# Patient Record
Sex: MALE | Race: WHITE | NOT HISPANIC OR LATINO | Employment: OTHER | URBAN - NONMETROPOLITAN AREA
[De-identification: names, ages, dates, MRNs, and addresses within clinical notes are randomized per-mention and may not be internally consistent; named-entity substitution may affect disease eponyms.]

---

## 2017-03-23 ENCOUNTER — OFFICE VISIT (OUTPATIENT)
Dept: CARDIOLOGY | Facility: CLINIC | Age: 52
End: 2017-03-23

## 2017-03-23 VITALS
WEIGHT: 234 LBS | OXYGEN SATURATION: 98 % | SYSTOLIC BLOOD PRESSURE: 170 MMHG | HEART RATE: 86 BPM | BODY MASS INDEX: 32.76 KG/M2 | HEIGHT: 71 IN | DIASTOLIC BLOOD PRESSURE: 82 MMHG

## 2017-03-23 DIAGNOSIS — E66.09 NON MORBID OBESITY DUE TO EXCESS CALORIES: ICD-10-CM

## 2017-03-23 DIAGNOSIS — Z72.0 TOBACCO ABUSE: ICD-10-CM

## 2017-03-23 DIAGNOSIS — Z01.818 PREOPERATIVE EVALUATION TO RULE OUT SURGICAL CONTRAINDICATION: ICD-10-CM

## 2017-03-23 DIAGNOSIS — I48.0 PAROXYSMAL ATRIAL FIBRILLATION (HCC): ICD-10-CM

## 2017-03-23 DIAGNOSIS — I50.22 CHRONIC SYSTOLIC (CONGESTIVE) HEART FAILURE (HCC): Primary | ICD-10-CM

## 2017-03-23 DIAGNOSIS — I10 ESSENTIAL HYPERTENSION: ICD-10-CM

## 2017-03-23 PROBLEM — E78.5 DYSLIPIDEMIA: Status: ACTIVE | Noted: 2017-03-23

## 2017-03-23 PROBLEM — K21.9 GERD (GASTROESOPHAGEAL REFLUX DISEASE): Status: ACTIVE | Noted: 2017-03-23

## 2017-03-23 PROBLEM — F41.9 ANXIETY: Status: ACTIVE | Noted: 2017-03-23

## 2017-03-23 PROBLEM — J44.9 COPD (CHRONIC OBSTRUCTIVE PULMONARY DISEASE) (HCC): Status: ACTIVE | Noted: 2017-03-23

## 2017-03-23 PROCEDURE — 99214 OFFICE O/P EST MOD 30 MIN: CPT | Performed by: INTERNAL MEDICINE

## 2017-03-23 RX ORDER — CARVEDILOL 3.12 MG/1
3.12 TABLET ORAL 2 TIMES DAILY
Refills: 1 | COMMUNITY
Start: 2017-03-08 | End: 2017-03-23 | Stop reason: SDUPTHER

## 2017-03-23 RX ORDER — HYDROCODONE BITARTRATE AND ACETAMINOPHEN 7.5; 325 MG/1; MG/1
7.5-325 TABLET ORAL 4 TIMES DAILY
Refills: 0 | Status: ON HOLD | COMMUNITY
Start: 2017-03-01 | End: 2017-11-22

## 2017-03-23 RX ORDER — DULOXETINE HYDROCHLORIDE 30 MG/1
250 CAPSULE, DELAYED RELEASE ORAL DAILY
Refills: 0 | COMMUNITY
Start: 2017-02-23

## 2017-03-23 RX ORDER — APIXABAN 5 MG/1
5 TABLET, FILM COATED ORAL 2 TIMES DAILY
Qty: 60 TABLET | Refills: 11 | Status: SHIPPED | OUTPATIENT
Start: 2017-03-23 | End: 2020-05-04 | Stop reason: SDUPTHER

## 2017-03-23 RX ORDER — CARVEDILOL 6.25 MG/1
6.25 TABLET ORAL 2 TIMES DAILY
Qty: 60 TABLET | Refills: 11 | Status: SHIPPED | OUTPATIENT
Start: 2017-03-23 | End: 2018-01-18 | Stop reason: SDUPTHER

## 2017-03-23 RX ORDER — OXYCODONE AND ACETAMINOPHEN 10; 325 MG/1; MG/1
10-325 TABLET ORAL 4 TIMES DAILY
Refills: 0 | COMMUNITY
Start: 2017-03-20 | End: 2019-01-10

## 2017-03-23 RX ORDER — LISINOPRIL 20 MG/1
20 TABLET ORAL DAILY
Refills: 0 | Status: ON HOLD | COMMUNITY
Start: 2017-02-22 | End: 2017-11-22

## 2017-03-23 RX ORDER — FUROSEMIDE 40 MG/1
40 TABLET ORAL DAILY
Refills: 0 | COMMUNITY
Start: 2017-01-21 | End: 2017-03-23 | Stop reason: SDUPTHER

## 2017-03-23 RX ORDER — APIXABAN 5 MG/1
5 TABLET, FILM COATED ORAL 2 TIMES DAILY
Refills: 0 | COMMUNITY
Start: 2017-01-04 | End: 2017-03-23 | Stop reason: SDUPTHER

## 2017-03-23 RX ORDER — FUROSEMIDE 40 MG/1
40 TABLET ORAL DAILY
Qty: 30 TABLET | Refills: 11 | Status: SHIPPED | OUTPATIENT
Start: 2017-03-23

## 2017-03-23 RX ORDER — PANTOPRAZOLE SODIUM 40 MG/1
40 TABLET, DELAYED RELEASE ORAL DAILY
Refills: 0 | COMMUNITY
Start: 2017-03-07

## 2017-03-23 RX ORDER — HYDROXYZINE PAMOATE 50 MG/1
50 CAPSULE ORAL DAILY
Refills: 0 | COMMUNITY
Start: 2016-12-15 | End: 2019-01-10

## 2017-03-23 RX ORDER — CITALOPRAM 20 MG/1
20 TABLET ORAL DAILY
Refills: 0 | COMMUNITY
Start: 2017-02-22

## 2017-03-23 NOTE — PROGRESS NOTES
Reason for Visit: cardiovascular follow up.    HPI:  Juan A Chavez is a 52 y.o. male is here today for follow-up.  He was seen in the emergency room yesterday for dyspnea.  NT-proBNP was elevated at 1867 (< 900 is normal).  He was diagnosed with a mild CHF exacerbation with possible pneumonia.  His BNP on 2/14/16 was 2020.  CXR demonstrates mildly prominent interstitial markings, cannot exclude mild right lower lobe infiltrate.  He woke up yesterday and was unable to breath.  Felt like he could not draw a breath.  He was given a breathing treatment, lasix, and antibiotics.  He feels better today overall but still is somewhat tired and wore out.  He has not noticed any swelling or weight change.  He has a hernia in his abdomen that needs to be repaired.  He is not very active and gets out of breath with exertion.  Dr Mallory is going to be doing the surgery.  He denies any chest pain.    Previous Cardiac Testing and Procedures:  - Echo (7/19/14) mild to moderately enlarged LV, EF 30-35%  - Lipid panel (2/15/16) 95/28/52/123    Patient Active Problem List   Diagnosis   • Chronic systolic (congestive) heart failure   • Paroxysmal atrial fibrillation   • GERD (gastroesophageal reflux disease)   • COPD (chronic obstructive pulmonary disease)   • Tobacco abuse   • Essential hypertension   • Dyslipidemia   • Anxiety       Social History   Substance Use Topics   • Smoking status: Current Every Day Smoker     Packs/day: 1.50     Types: Cigarettes   • Smokeless tobacco: Never Used   • Alcohol use Yes       Family History   Problem Relation Age of Onset   • Hypertension Other    • Heart disease Other    • Hyperlipidemia Other    • Heart disease Mother    • Stroke Mother    • No Known Problems Father        The following portions of the patient's history were reviewed and updated as appropriate: allergies, current medications, past family history, past medical history, past social history, past surgical history and problem  list.      Current Outpatient Prescriptions:   •  carvedilol (COREG) 3.125 MG tablet, Take 3.125 mg by mouth 2 (Two) Times a Day., Disp: , Rfl: 1  •  citalopram (CeleXA) 20 MG tablet, Take 20 mg by mouth 2 (Two) Times a Day., Disp: , Rfl: 0  •  DIGITEK 250 MCG tablet, Take 250 mcg by mouth Daily., Disp: , Rfl: 0  •  ELIQUIS 5 MG tablet tablet, Take 5 mg by mouth 2 (Two) Times a Day., Disp: , Rfl: 0  •  furosemide (LASIX) 40 MG tablet, Take 40 mg by mouth Daily., Disp: , Rfl: 0  •  HYDROcodone-acetaminophen (NORCO) 7.5-325 MG per tablet, Take 7.5-325 tablets by mouth 4 (Four) Times a Day., Disp: , Rfl: 0  •  hydrOXYzine (VISTARIL) 50 MG capsule, Take 50 mg by mouth Daily., Disp: , Rfl: 0  •  lisinopril (PRINIVIL,ZESTRIL) 20 MG tablet, Take 20 mg by mouth Daily., Disp: , Rfl: 0  •  oxyCODONE-acetaminophen (PERCOCET)  MG per tablet, Take  tablets by mouth 4 (Four) Times a Day., Disp: , Rfl: 0  •  pantoprazole (PROTONIX) 40 MG EC tablet, Take 40 mg by mouth Daily., Disp: , Rfl: 0    Review of Systems   Constitution: Positive for malaise/fatigue. Negative for chills, decreased appetite and fever.   HENT: Positive for congestion. Negative for headaches and nosebleeds.    Eyes: Negative for blurred vision and double vision.   Cardiovascular: Negative for chest pain, irregular heartbeat, leg swelling, orthopnea, palpitations and syncope.   Respiratory: Positive for cough and shortness of breath.    Endocrine: Negative for cold intolerance and heat intolerance.   Hematologic/Lymphatic: Does not bruise/bleed easily.   Skin: Positive for dry skin. Negative for itching and rash.   Musculoskeletal: Positive for back pain, joint pain and neck pain. Negative for muscle cramps.   Gastrointestinal: Positive for abdominal pain. Negative for constipation, diarrhea, heartburn and melena.   Genitourinary: Negative for dysuria, frequency and hematuria.   Neurological: Negative for dizziness, light-headedness and loss of  "balance.   Psychiatric/Behavioral: Positive for depression. The patient has insomnia and is nervous/anxious.        Objective   /82 (BP Location: Left arm, Patient Position: Sitting, Cuff Size: Large Adult)  Pulse 86  Ht 71\" (180.3 cm)  Wt 234 lb (106 kg)  SpO2 98%  BMI 32.64 kg/m2  Physical Exam   Constitutional: He is oriented to person, place, and time. He appears well-developed and well-nourished.   HENT:   Head: Normocephalic and atraumatic.   Cardiovascular: Normal rate, regular rhythm and normal heart sounds.    No murmur heard.  Pulmonary/Chest: Effort normal and breath sounds normal.   Musculoskeletal: He exhibits no edema.   Neurological: He is alert and oriented to person, place, and time.   Skin: Skin is warm and dry.   Psychiatric: He has a normal mood and affect.     Procedures      ICD-10-CM ICD-9-CM   1. Chronic systolic (congestive) heart failure I50.22 428.22     428.0   2. Paroxysmal atrial fibrillation I48.0 427.31   3. Essential hypertension I10 401.9   4. Tobacco abuse Z72.0 305.1   5. Non morbid obesity due to excess calories E66.09 278.00         Assessment/Plan:  1.  Chronic systolic congestive heart failure: BNP from yesterday is down from previous value 1 year ago.  Chest x-ray from 03/22/2017 demonstrates mild prominent interstitial markings, possible mild failure.  Patient is out of his Lasix area and we will refill this.  Will titrate up carvedilol to 6.25 mg twice a day.  Continue lisinopril.  Will repeat echocardiogram.    2.  Paroxysmal atrial fibrillation: Managed on carvedilol and anticoagulation with Eliquis.  Patient notes Medicare requires prior authorization for his Eliquis.  Will reorder prescription and performed prior authorization.    3.  Essential hypertension: Systolic blood pressure is elevated today.  Will increase carvedilol to 6.25 mg twice a day.  Continue lisinopril 20 mg daily, patient notes hypotension the higher doses.    4.  Tobacco abuse: Smoking " 1-1/2 packs per day.   on cessation.    5.  Preoperative evaluation: Patient appears intermediate risk from a cardiac standpoint.  Current chest x-ray demonstrates possible mild congestion.  Will optimize medical therapy with Lasix, carvedilol, and lisinopril.  Will repeat echocardiogram.  If there are no significant changes then could likely proceed to surgery as an intermediate risk candidate.

## 2017-03-29 ENCOUNTER — TELEPHONE (OUTPATIENT)
Dept: CARDIOLOGY | Facility: CLINIC | Age: 52
End: 2017-03-29

## 2017-03-29 NOTE — TELEPHONE ENCOUNTER
----- Message from Pradeep Hwnag MD sent at 3/29/2017 11:20 AM CDT -----  Please let him know that his echocardiogram showed that his heart function is relatively unchanged compared to the previous value.  It is reasonable for him to proceed to surgery for his hernia repair.      Called [facility] for pt to call me back.

## 2017-11-19 ENCOUNTER — APPOINTMENT (OUTPATIENT)
Dept: CT IMAGING | Facility: HOSPITAL | Age: 52
End: 2017-11-19

## 2017-11-19 ENCOUNTER — HOSPITAL ENCOUNTER (INPATIENT)
Facility: HOSPITAL | Age: 52
LOS: 3 days | Discharge: HOME | End: 2017-11-22
Attending: EMERGENCY MEDICINE | Admitting: INTERNAL MEDICINE

## 2017-11-19 ENCOUNTER — APPOINTMENT (OUTPATIENT)
Dept: GENERAL RADIOLOGY | Facility: HOSPITAL | Age: 52
End: 2017-11-19

## 2017-11-19 DIAGNOSIS — H10.023 OTHER MUCOPURULENT CONJUNCTIVITIS OF BOTH EYES: ICD-10-CM

## 2017-11-19 DIAGNOSIS — I21.4 NSTEMI (NON-ST ELEVATED MYOCARDIAL INFARCTION) (HCC): Primary | ICD-10-CM

## 2017-11-19 LAB
ALBUMIN SERPL-MCNC: 3.8 G/DL (ref 3.5–5)
ALBUMIN/GLOB SERPL: 1.2 G/DL (ref 1.1–2.5)
ALP SERPL-CCNC: 69 U/L (ref 24–120)
ALT SERPL W P-5'-P-CCNC: 109 U/L (ref 0–54)
ANION GAP SERPL CALCULATED.3IONS-SCNC: 9 MMOL/L (ref 4–13)
APTT PPP: 34 SECONDS (ref 24.1–34.8)
AST SERPL-CCNC: 105 U/L (ref 7–45)
BASOPHILS # BLD AUTO: 0.04 10*3/MM3 (ref 0–0.2)
BASOPHILS NFR BLD AUTO: 0.4 % (ref 0–2)
BILIRUB SERPL-MCNC: 0.5 MG/DL (ref 0.1–1)
BUN BLD-MCNC: 10 MG/DL (ref 5–21)
BUN/CREAT SERPL: 18.5 (ref 7–25)
CALCIUM SPEC-SCNC: 9.1 MG/DL (ref 8.4–10.4)
CHLORIDE SERPL-SCNC: 105 MMOL/L (ref 98–110)
CO2 SERPL-SCNC: 28 MMOL/L (ref 24–31)
CREAT BLD-MCNC: 0.54 MG/DL (ref 0.5–1.4)
DEPRECATED RDW RBC AUTO: 49.3 FL (ref 40–54)
DIGOXIN SERPL-MCNC: 0.9 NG/ML (ref 0.8–2)
EOSINOPHIL # BLD AUTO: 0.45 10*3/MM3 (ref 0–0.7)
EOSINOPHIL NFR BLD AUTO: 4.8 % (ref 0–4)
ERYTHROCYTE [DISTWIDTH] IN BLOOD BY AUTOMATED COUNT: 13.8 % (ref 12–15)
GFR SERPL CREATININE-BSD FRML MDRD: >150 ML/MIN/1.73
GLOBULIN UR ELPH-MCNC: 3.3 GM/DL
GLUCOSE BLD-MCNC: 81 MG/DL (ref 70–100)
HCT VFR BLD AUTO: 41.3 % (ref 40–52)
HGB BLD-MCNC: 13.9 G/DL (ref 14–18)
HOLD SPECIMEN: NORMAL
HOLD SPECIMEN: NORMAL
IMM GRANULOCYTES # BLD: 0.02 10*3/MM3 (ref 0–0.03)
IMM GRANULOCYTES NFR BLD: 0.2 % (ref 0–5)
INR PPP: 1.07 (ref 0.91–1.09)
LYMPHOCYTES # BLD AUTO: 2.36 10*3/MM3 (ref 0.72–4.86)
LYMPHOCYTES NFR BLD AUTO: 25.2 % (ref 15–45)
MCH RBC QN AUTO: 32.6 PG (ref 28–32)
MCHC RBC AUTO-ENTMCNC: 33.7 G/DL (ref 33–36)
MCV RBC AUTO: 96.7 FL (ref 82–95)
MONOCYTES # BLD AUTO: 1.09 10*3/MM3 (ref 0.19–1.3)
MONOCYTES NFR BLD AUTO: 11.7 % (ref 4–12)
NEUTROPHILS # BLD AUTO: 5.39 10*3/MM3 (ref 1.87–8.4)
NEUTROPHILS NFR BLD AUTO: 57.7 % (ref 39–78)
NT-PROBNP SERPL-MCNC: 2640 PG/ML (ref 0–900)
PLATELET # BLD AUTO: 165 10*3/MM3 (ref 130–400)
PMV BLD AUTO: 11.5 FL (ref 6–12)
POTASSIUM BLD-SCNC: 4 MMOL/L (ref 3.5–5.3)
PROT SERPL-MCNC: 7.1 G/DL (ref 6.3–8.7)
PROTHROMBIN TIME: 14.2 SECONDS (ref 11.9–14.6)
RBC # BLD AUTO: 4.27 10*6/MM3 (ref 4.8–5.9)
SODIUM BLD-SCNC: 142 MMOL/L (ref 135–145)
TROPONIN I SERPL-MCNC: 0.07 NG/ML (ref 0–0.03)
WBC NRBC COR # BLD: 9.35 10*3/MM3 (ref 4.8–10.8)
WHOLE BLOOD HOLD SPECIMEN: NORMAL
WHOLE BLOOD HOLD SPECIMEN: NORMAL

## 2017-11-19 PROCEDURE — 25010000002 HYDROMORPHONE PER 4 MG: Performed by: EMERGENCY MEDICINE

## 2017-11-19 PROCEDURE — 93005 ELECTROCARDIOGRAM TRACING: CPT | Performed by: EMERGENCY MEDICINE

## 2017-11-19 PROCEDURE — 80053 COMPREHEN METABOLIC PANEL: CPT | Performed by: EMERGENCY MEDICINE

## 2017-11-19 PROCEDURE — 25010000002 FENTANYL CITRATE (PF) 100 MCG/2ML SOLUTION: Performed by: EMERGENCY MEDICINE

## 2017-11-19 PROCEDURE — 84484 ASSAY OF TROPONIN QUANT: CPT | Performed by: EMERGENCY MEDICINE

## 2017-11-19 PROCEDURE — 85610 PROTHROMBIN TIME: CPT | Performed by: EMERGENCY MEDICINE

## 2017-11-19 PROCEDURE — 93010 ELECTROCARDIOGRAM REPORT: CPT | Performed by: INTERNAL MEDICINE

## 2017-11-19 PROCEDURE — 99285 EMERGENCY DEPT VISIT HI MDM: CPT

## 2017-11-19 PROCEDURE — 80162 ASSAY OF DIGOXIN TOTAL: CPT | Performed by: EMERGENCY MEDICINE

## 2017-11-19 PROCEDURE — 85025 COMPLETE CBC W/AUTO DIFF WBC: CPT | Performed by: EMERGENCY MEDICINE

## 2017-11-19 PROCEDURE — 71275 CT ANGIOGRAPHY CHEST: CPT

## 2017-11-19 PROCEDURE — 71010 HC CHEST PA OR AP: CPT

## 2017-11-19 PROCEDURE — 85730 THROMBOPLASTIN TIME PARTIAL: CPT | Performed by: EMERGENCY MEDICINE

## 2017-11-19 PROCEDURE — 0 IOPAMIDOL PER 1 ML: Performed by: EMERGENCY MEDICINE

## 2017-11-19 PROCEDURE — 94799 UNLISTED PULMONARY SVC/PX: CPT

## 2017-11-19 PROCEDURE — 94640 AIRWAY INHALATION TREATMENT: CPT

## 2017-11-19 PROCEDURE — 83880 ASSAY OF NATRIURETIC PEPTIDE: CPT | Performed by: EMERGENCY MEDICINE

## 2017-11-19 RX ORDER — FENTANYL CITRATE 50 UG/ML
25 INJECTION, SOLUTION INTRAMUSCULAR; INTRAVENOUS ONCE
Status: COMPLETED | OUTPATIENT
Start: 2017-11-19 | End: 2017-11-19

## 2017-11-19 RX ORDER — ASPIRIN 81 MG/1
81 TABLET, CHEWABLE ORAL DAILY
Status: DISCONTINUED | OUTPATIENT
Start: 2017-11-20 | End: 2017-11-22 | Stop reason: HOSPADM

## 2017-11-19 RX ORDER — HYDROXYZINE PAMOATE 50 MG/1
50 CAPSULE ORAL DAILY
Status: DISCONTINUED | OUTPATIENT
Start: 2017-11-20 | End: 2017-11-22 | Stop reason: HOSPADM

## 2017-11-19 RX ORDER — NITROGLYCERIN 0.4 MG/1
0.4 TABLET SUBLINGUAL
Status: DISCONTINUED | OUTPATIENT
Start: 2017-11-19 | End: 2017-11-22 | Stop reason: HOSPADM

## 2017-11-19 RX ORDER — CARVEDILOL 6.25 MG/1
6.25 TABLET ORAL 2 TIMES DAILY
Status: DISCONTINUED | OUTPATIENT
Start: 2017-11-20 | End: 2017-11-22 | Stop reason: HOSPADM

## 2017-11-19 RX ORDER — CITALOPRAM 20 MG/1
20 TABLET ORAL 2 TIMES DAILY
Status: DISCONTINUED | OUTPATIENT
Start: 2017-11-20 | End: 2017-11-22 | Stop reason: HOSPADM

## 2017-11-19 RX ORDER — ASPIRIN 81 MG/1
324 TABLET, CHEWABLE ORAL ONCE
Status: COMPLETED | OUTPATIENT
Start: 2017-11-19 | End: 2017-11-19

## 2017-11-19 RX ORDER — SODIUM CHLORIDE 0.9 % (FLUSH) 0.9 %
1-10 SYRINGE (ML) INJECTION AS NEEDED
Status: DISCONTINUED | OUTPATIENT
Start: 2017-11-19 | End: 2017-11-19

## 2017-11-19 RX ORDER — ALBUTEROL SULFATE 2.5 MG/3ML
2.5 SOLUTION RESPIRATORY (INHALATION) ONCE
Status: COMPLETED | OUTPATIENT
Start: 2017-11-19 | End: 2017-11-19

## 2017-11-19 RX ORDER — BISACODYL 5 MG/1
5 TABLET, DELAYED RELEASE ORAL DAILY PRN
Status: DISCONTINUED | OUTPATIENT
Start: 2017-11-19 | End: 2017-11-22 | Stop reason: HOSPADM

## 2017-11-19 RX ORDER — ONDANSETRON 4 MG/1
4 TABLET, FILM COATED ORAL EVERY 6 HOURS PRN
Status: DISCONTINUED | OUTPATIENT
Start: 2017-11-19 | End: 2017-11-22 | Stop reason: HOSPADM

## 2017-11-19 RX ORDER — SODIUM CHLORIDE 0.9 % (FLUSH) 0.9 %
10 SYRINGE (ML) INJECTION AS NEEDED
Status: DISCONTINUED | OUTPATIENT
Start: 2017-11-19 | End: 2017-11-19

## 2017-11-19 RX ORDER — NITROGLYCERIN 20 MG/100ML
10-50 INJECTION INTRAVENOUS
Status: DISCONTINUED | OUTPATIENT
Start: 2017-11-19 | End: 2017-11-22 | Stop reason: HOSPADM

## 2017-11-19 RX ORDER — SODIUM CHLORIDE 0.9 % (FLUSH) 0.9 %
10 SYRINGE (ML) INJECTION AS NEEDED
Status: DISCONTINUED | OUTPATIENT
Start: 2017-11-19 | End: 2017-11-22 | Stop reason: HOSPADM

## 2017-11-19 RX ORDER — DIGOXIN 250 MCG
250 TABLET ORAL DAILY
Status: DISCONTINUED | OUTPATIENT
Start: 2017-11-20 | End: 2017-11-22 | Stop reason: HOSPADM

## 2017-11-19 RX ORDER — ALUMINA, MAGNESIA, AND SIMETHICONE 2400; 2400; 240 MG/30ML; MG/30ML; MG/30ML
15 SUSPENSION ORAL EVERY 6 HOURS PRN
Status: DISCONTINUED | OUTPATIENT
Start: 2017-11-19 | End: 2017-11-22 | Stop reason: HOSPADM

## 2017-11-19 RX ORDER — PANTOPRAZOLE SODIUM 40 MG/1
40 TABLET, DELAYED RELEASE ORAL DAILY
Status: DISCONTINUED | OUTPATIENT
Start: 2017-11-20 | End: 2017-11-22 | Stop reason: HOSPADM

## 2017-11-19 RX ORDER — LISINOPRIL 10 MG/1
20 TABLET ORAL DAILY
Status: DISCONTINUED | OUTPATIENT
Start: 2017-11-20 | End: 2017-11-21

## 2017-11-19 RX ORDER — OXYCODONE AND ACETAMINOPHEN 10; 325 MG/1; MG/1
1 TABLET ORAL EVERY 6 HOURS PRN
Status: DISCONTINUED | OUTPATIENT
Start: 2017-11-19 | End: 2017-11-20

## 2017-11-19 RX ORDER — ATORVASTATIN CALCIUM 40 MG/1
40 TABLET, FILM COATED ORAL NIGHTLY
Status: DISCONTINUED | OUTPATIENT
Start: 2017-11-19 | End: 2017-11-22 | Stop reason: HOSPADM

## 2017-11-19 RX ADMIN — NITROGLYCERIN 0.4 MG: 0.4 TABLET SUBLINGUAL at 21:26

## 2017-11-19 RX ADMIN — ASPIRIN 81 MG CHEWABLE TABLET 324 MG: 81 TABLET CHEWABLE at 21:23

## 2017-11-19 RX ADMIN — HYDROMORPHONE HYDROCHLORIDE 1 MG: 1 INJECTION, SOLUTION INTRAMUSCULAR; INTRAVENOUS; SUBCUTANEOUS at 22:45

## 2017-11-19 RX ADMIN — ALBUTEROL SULFATE 2.5 MG: 2.5 SOLUTION RESPIRATORY (INHALATION) at 21:01

## 2017-11-19 RX ADMIN — FENTANYL CITRATE 25 MCG: 50 INJECTION, SOLUTION INTRAMUSCULAR; INTRAVENOUS at 21:55

## 2017-11-19 RX ADMIN — NITROGLYCERIN 0.4 MG: 0.4 TABLET SUBLINGUAL at 21:43

## 2017-11-19 RX ADMIN — IOPAMIDOL 150 ML: 755 INJECTION, SOLUTION INTRAVENOUS at 23:34

## 2017-11-19 RX ADMIN — NITROGLYCERIN 10 MCG/MIN: 20 INJECTION INTRAVENOUS at 22:01

## 2017-11-20 ENCOUNTER — APPOINTMENT (OUTPATIENT)
Dept: CARDIOLOGY | Facility: HOSPITAL | Age: 52
End: 2017-11-20

## 2017-11-20 LAB
ANION GAP SERPL CALCULATED.3IONS-SCNC: 7 MMOL/L (ref 4–13)
ARTICHOKE IGE QN: 61 MG/DL (ref 0–99)
BASOPHILS # BLD AUTO: 0.04 10*3/MM3 (ref 0–0.2)
BASOPHILS NFR BLD AUTO: 0.6 % (ref 0–2)
BH CV STRESS BP STAGE 1: NORMAL
BH CV STRESS COMMENTS STAGE 1: NORMAL
BH CV STRESS DOSE REGADENOSON STAGE 1: 0.4
BH CV STRESS DURATION MIN STAGE 1: 0
BH CV STRESS DURATION SEC STAGE 1: 10
BH CV STRESS HR STAGE 1: 63
BH CV STRESS PROTOCOL 1: NORMAL
BH CV STRESS RECOVERY BP: NORMAL MMHG
BH CV STRESS RECOVERY HR: 66 BPM
BH CV STRESS STAGE 1: 1
BUN BLD-MCNC: 12 MG/DL (ref 5–21)
BUN/CREAT SERPL: 23.1 (ref 7–25)
CALCIUM SPEC-SCNC: 8.6 MG/DL (ref 8.4–10.4)
CHLORIDE SERPL-SCNC: 104 MMOL/L (ref 98–110)
CHOLEST SERPL-MCNC: 89 MG/DL (ref 130–200)
CO2 SERPL-SCNC: 32 MMOL/L (ref 24–31)
CREAT BLD-MCNC: 0.52 MG/DL (ref 0.5–1.4)
DEPRECATED RDW RBC AUTO: 49.8 FL (ref 40–54)
EOSINOPHIL # BLD AUTO: 0.53 10*3/MM3 (ref 0–0.7)
EOSINOPHIL NFR BLD AUTO: 7.9 % (ref 0–4)
ERYTHROCYTE [DISTWIDTH] IN BLOOD BY AUTOMATED COUNT: 13.9 % (ref 12–15)
GFR SERPL CREATININE-BSD FRML MDRD: >150 ML/MIN/1.73
GLUCOSE BLD-MCNC: 95 MG/DL (ref 70–100)
HBA1C MFR BLD: 4.9 %
HCT VFR BLD AUTO: 41.2 % (ref 40–52)
HDLC SERPL-MCNC: 17 MG/DL
HGB BLD-MCNC: 13.3 G/DL (ref 14–18)
IMM GRANULOCYTES # BLD: 0.01 10*3/MM3 (ref 0–0.03)
IMM GRANULOCYTES NFR BLD: 0.1 % (ref 0–5)
LDLC/HDLC SERPL: 2.96 {RATIO}
LV EF NUC BP: 33 %
LYMPHOCYTES # BLD AUTO: 1.79 10*3/MM3 (ref 0.72–4.86)
LYMPHOCYTES NFR BLD AUTO: 26.5 % (ref 15–45)
MAXIMAL PREDICTED HEART RATE: 166 BPM
MCH RBC QN AUTO: 31.5 PG (ref 28–32)
MCHC RBC AUTO-ENTMCNC: 32.3 G/DL (ref 33–36)
MCV RBC AUTO: 97.6 FL (ref 82–95)
MONOCYTES # BLD AUTO: 0.88 10*3/MM3 (ref 0.19–1.3)
MONOCYTES NFR BLD AUTO: 13 % (ref 4–12)
NEUTROPHILS # BLD AUTO: 3.5 10*3/MM3 (ref 1.87–8.4)
NEUTROPHILS NFR BLD AUTO: 51.9 % (ref 39–78)
NT-PROBNP SERPL-MCNC: 2080 PG/ML (ref 0–900)
PERCENT MAX PREDICTED HR: 37.95 %
PLATELET # BLD AUTO: 141 10*3/MM3 (ref 130–400)
PMV BLD AUTO: 11.6 FL (ref 6–12)
POTASSIUM BLD-SCNC: 3.7 MMOL/L (ref 3.5–5.3)
RBC # BLD AUTO: 4.22 10*6/MM3 (ref 4.8–5.9)
SODIUM BLD-SCNC: 143 MMOL/L (ref 135–145)
STRESS BASELINE BP: NORMAL MMHG
STRESS BASELINE HR: 60 BPM
STRESS PERCENT HR: 45 %
STRESS POST EXERCISE DUR SEC: 10 SEC
STRESS POST PEAK BP: NORMAL MMHG
STRESS POST PEAK HR: 63 BPM
STRESS TARGET HR: 141 BPM
TRIGL SERPL-MCNC: 108 MG/DL (ref 0–149)
TROPONIN I SERPL-MCNC: 0.03 NG/ML (ref 0–0.03)
TROPONIN I SERPL-MCNC: 0.04 NG/ML (ref 0–0.03)
TROPONIN I SERPL-MCNC: 0.06 NG/ML (ref 0–0.03)
TROPONIN I SERPL-MCNC: 0.07 NG/ML (ref 0–0.03)
WBC NRBC COR # BLD: 6.75 10*3/MM3 (ref 4.8–10.8)

## 2017-11-20 PROCEDURE — 93017 CV STRESS TEST TRACING ONLY: CPT

## 2017-11-20 PROCEDURE — 93018 CV STRESS TEST I&R ONLY: CPT | Performed by: INTERNAL MEDICINE

## 2017-11-20 PROCEDURE — 83880 ASSAY OF NATRIURETIC PEPTIDE: CPT | Performed by: INTERNAL MEDICINE

## 2017-11-20 PROCEDURE — 25010000002 FUROSEMIDE PER 20 MG: Performed by: INTERNAL MEDICINE

## 2017-11-20 PROCEDURE — A9500 TC99M SESTAMIBI: HCPCS | Performed by: INTERNAL MEDICINE

## 2017-11-20 PROCEDURE — 78452 HT MUSCLE IMAGE SPECT MULT: CPT

## 2017-11-20 PROCEDURE — 80048 BASIC METABOLIC PNL TOTAL CA: CPT | Performed by: INTERNAL MEDICINE

## 2017-11-20 PROCEDURE — 94799 UNLISTED PULMONARY SVC/PX: CPT

## 2017-11-20 PROCEDURE — 84484 ASSAY OF TROPONIN QUANT: CPT | Performed by: INTERNAL MEDICINE

## 2017-11-20 PROCEDURE — 84484 ASSAY OF TROPONIN QUANT: CPT | Performed by: EMERGENCY MEDICINE

## 2017-11-20 PROCEDURE — 80061 LIPID PANEL: CPT | Performed by: INTERNAL MEDICINE

## 2017-11-20 PROCEDURE — 83036 HEMOGLOBIN GLYCOSYLATED A1C: CPT | Performed by: INTERNAL MEDICINE

## 2017-11-20 PROCEDURE — 78452 HT MUSCLE IMAGE SPECT MULT: CPT | Performed by: INTERNAL MEDICINE

## 2017-11-20 PROCEDURE — 99223 1ST HOSP IP/OBS HIGH 75: CPT | Performed by: INTERNAL MEDICINE

## 2017-11-20 PROCEDURE — 94640 AIRWAY INHALATION TREATMENT: CPT

## 2017-11-20 PROCEDURE — 85025 COMPLETE CBC W/AUTO DIFF WBC: CPT | Performed by: INTERNAL MEDICINE

## 2017-11-20 PROCEDURE — 36415 COLL VENOUS BLD VENIPUNCTURE: CPT | Performed by: INTERNAL MEDICINE

## 2017-11-20 PROCEDURE — 36415 COLL VENOUS BLD VENIPUNCTURE: CPT | Performed by: EMERGENCY MEDICINE

## 2017-11-20 PROCEDURE — 0 TECHNETIUM SESTAMIBI: Performed by: INTERNAL MEDICINE

## 2017-11-20 PROCEDURE — 25010000002 REGADENOSON 0.4 MG/5ML SOLUTION: Performed by: INTERNAL MEDICINE

## 2017-11-20 RX ORDER — DIFLUPREDNATE OPHTHALMIC 0.5 MG/ML
1 EMULSION OPHTHALMIC 3 TIMES DAILY
COMMUNITY
End: 2017-12-09 | Stop reason: HOSPADM

## 2017-11-20 RX ORDER — SPIRONOLACTONE 25 MG/1
25 TABLET ORAL DAILY
Status: DISCONTINUED | OUTPATIENT
Start: 2017-11-20 | End: 2017-11-22 | Stop reason: HOSPADM

## 2017-11-20 RX ORDER — FUROSEMIDE 10 MG/ML
40 INJECTION INTRAMUSCULAR; INTRAVENOUS ONCE
Status: COMPLETED | OUTPATIENT
Start: 2017-11-20 | End: 2017-11-20

## 2017-11-20 RX ORDER — TRAMADOL HYDROCHLORIDE 50 MG/1
100 TABLET ORAL EVERY 8 HOURS PRN
COMMUNITY
End: 2019-05-20

## 2017-11-20 RX ORDER — AMOXICILLIN AND CLAVULANATE POTASSIUM 875; 125 MG/1; MG/1
1 TABLET, FILM COATED ORAL 2 TIMES DAILY
COMMUNITY
End: 2017-12-09 | Stop reason: HOSPADM

## 2017-11-20 RX ORDER — NICOTINE 21 MG/24HR
1 PATCH, TRANSDERMAL 24 HOURS TRANSDERMAL EVERY 24 HOURS
Status: DISCONTINUED | OUTPATIENT
Start: 2017-11-20 | End: 2017-11-21

## 2017-11-20 RX ORDER — OXYCODONE AND ACETAMINOPHEN 10; 325 MG/1; MG/1
1 TABLET ORAL EVERY 6 HOURS PRN
Status: DISCONTINUED | OUTPATIENT
Start: 2017-11-20 | End: 2017-11-21

## 2017-11-20 RX ORDER — PREDNISONE 20 MG/1
40 TABLET ORAL
Status: DISCONTINUED | OUTPATIENT
Start: 2017-11-20 | End: 2017-11-22 | Stop reason: HOSPADM

## 2017-11-20 RX ORDER — IPRATROPIUM BROMIDE AND ALBUTEROL SULFATE 2.5; .5 MG/3ML; MG/3ML
3 SOLUTION RESPIRATORY (INHALATION)
Status: DISCONTINUED | OUTPATIENT
Start: 2017-11-20 | End: 2017-11-22 | Stop reason: HOSPADM

## 2017-11-20 RX ORDER — TRAMADOL HYDROCHLORIDE 50 MG/1
50 TABLET ORAL EVERY 8 HOURS PRN
Status: DISCONTINUED | OUTPATIENT
Start: 2017-11-20 | End: 2017-11-22 | Stop reason: HOSPADM

## 2017-11-20 RX ADMIN — CARVEDILOL 6.25 MG: 6.25 TABLET, FILM COATED ORAL at 17:18

## 2017-11-20 RX ADMIN — OXYCODONE HYDROCHLORIDE AND ACETAMINOPHEN 1 TABLET: 10; 325 TABLET ORAL at 08:03

## 2017-11-20 RX ADMIN — OXYCODONE HYDROCHLORIDE AND ACETAMINOPHEN 1 TABLET: 10; 325 TABLET ORAL at 20:05

## 2017-11-20 RX ADMIN — DESMOPRESSIN ACETATE 40 MG: 0.2 TABLET ORAL at 20:05

## 2017-11-20 RX ADMIN — NICOTINE 1 PATCH: 14 PATCH, EXTENDED RELEASE TRANSDERMAL at 12:10

## 2017-11-20 RX ADMIN — PANTOPRAZOLE SODIUM 40 MG: 40 TABLET, DELAYED RELEASE ORAL at 08:03

## 2017-11-20 RX ADMIN — OXYCODONE HYDROCHLORIDE AND ACETAMINOPHEN 1 TABLET: 10; 325 TABLET ORAL at 01:05

## 2017-11-20 RX ADMIN — DESMOPRESSIN ACETATE 40 MG: 0.2 TABLET ORAL at 01:06

## 2017-11-20 RX ADMIN — CITALOPRAM 20 MG: 20 TABLET, FILM COATED ORAL at 17:18

## 2017-11-20 RX ADMIN — TRAMADOL HYDROCHLORIDE 50 MG: 50 TABLET, FILM COATED ORAL at 12:10

## 2017-11-20 RX ADMIN — TECHNETIUM TC-99M SESTAMIBI 1 DOSE: 1 INJECTION INTRAVENOUS at 11:15

## 2017-11-20 RX ADMIN — FUROSEMIDE 40 MG: 10 INJECTION, SOLUTION INTRAMUSCULAR; INTRAVENOUS at 09:27

## 2017-11-20 RX ADMIN — CITALOPRAM 20 MG: 20 TABLET, FILM COATED ORAL at 08:03

## 2017-11-20 RX ADMIN — TECHNETIUM TC-99M SESTAMIBI 1 DOSE: 1 INJECTION INTRAVENOUS at 10:02

## 2017-11-20 RX ADMIN — IPRATROPIUM BROMIDE AND ALBUTEROL SULFATE 3 ML: 2.5; .5 SOLUTION RESPIRATORY (INHALATION) at 14:36

## 2017-11-20 RX ADMIN — REGADENOSON 0.4 MG: 0.08 INJECTION, SOLUTION INTRAVENOUS at 10:46

## 2017-11-20 RX ADMIN — CARVEDILOL 6.25 MG: 6.25 TABLET, FILM COATED ORAL at 08:03

## 2017-11-20 RX ADMIN — IPRATROPIUM BROMIDE AND ALBUTEROL SULFATE 3 ML: 2.5; .5 SOLUTION RESPIRATORY (INHALATION) at 21:06

## 2017-11-20 RX ADMIN — DIGOXIN 250 MCG: 0.25 TABLET ORAL at 08:03

## 2017-11-20 RX ADMIN — Medication 81 MG: at 08:03

## 2017-11-20 RX ADMIN — LISINOPRIL 20 MG: 10 TABLET ORAL at 08:03

## 2017-11-20 RX ADMIN — OXYCODONE HYDROCHLORIDE AND ACETAMINOPHEN 1 TABLET: 10; 325 TABLET ORAL at 14:11

## 2017-11-20 NOTE — ED PROVIDER NOTES
Subjective patient is a 52-year-old male who presents to the ER with chest pain.  Patient states he woke up this morning short of air.  Patient states he went to an outside ER and was told that he had had a coronary event.  Patient states he was angry with his treatment and refused to be transferred.  Patient states he went home and continued to be short of breath.  Patient states that 1-1/2 hours ago he developed chest pain.  Patient states the pain is located over his left anterior chest and is achy in nature.  Pain radiates to his left shoulder and jaw.  Pain has been intermittent.  Patient currently has pain and this last episode started approx 20 minutes ago.  Patient has a history of congestive heart failure and is status post CABG.  Patient denies having any stents and states he never had a cardiac cath.  Patient denies any fever, cough, abdominal pain, nausea vomiting diarrhea, urinary changes, neurological changes.  Patient states he is currently being treated by an ophthalmologist for bilateral eye infection.  He also denies any swelling to his bilateral lower extremities.  Nothing makes his chest pain better or worse.    History provided by:  Patient   used: No        Review of Systems   Constitutional: Negative.    HENT: Negative.    Eyes: Negative.    Respiratory: Positive for shortness of breath.    Cardiovascular: Positive for chest pain.   Gastrointestinal: Negative.    Endocrine: Negative.    Genitourinary: Negative.    Musculoskeletal: Negative.    Skin: Negative.    Allergic/Immunologic: Negative.    Neurological: Negative.    Hematological: Negative.    Psychiatric/Behavioral: Negative.    All other systems reviewed and are negative.      Past Medical History:   Diagnosis Date   • Anxiety    • Atrial fibrillation and flutter    • Back pain    • Chest pain    • CHF (congestive heart failure)     ECHO 3/14 EF 15-20%.   • Chronic clinical systolic heart failure     EF 35% 08/14    • COPD (chronic obstructive pulmonary disease)    • Coronary atherosclerosis    • Depression    • Hyperlipidemia    • Hypertension    • PTSD (post-traumatic stress disorder)    • Situational depression    • Tobacco abuse        Allergies   Allergen Reactions   • Morphine And Related        Past Surgical History:   Procedure Laterality Date   • CARDIAC CATHETERIZATION     • CORONARY ARTERY BYPASS GRAFT     • HERNIA REPAIR     • NECK SURGERY      for fusion       Family History   Problem Relation Age of Onset   • Hypertension Other    • Heart disease Other    • Hyperlipidemia Other    • Heart disease Mother    • Stroke Mother    • No Known Problems Father        Social History     Social History   • Marital status:      Spouse name: N/A   • Number of children: N/A   • Years of education: N/A     Social History Main Topics   • Smoking status: Current Every Day Smoker     Packs/day: 1.50     Types: Cigarettes   • Smokeless tobacco: Never Used   • Alcohol use Yes   • Drug use: No   • Sexual activity: Not Asked     Other Topics Concern   • None     Social History Narrative           Objective   Physical Exam   Constitutional: He is oriented to person, place, and time. He appears well-developed and well-nourished.   HENT:   Head: Normocephalic and atraumatic.   Eyes: Pupils are equal, round, and reactive to light. Right eye exhibits discharge and exudate. Left eye exhibits discharge and exudate. Right conjunctiva is injected. Left conjunctiva is injected.   Neck: Normal range of motion.   Cardiovascular: Normal rate, regular rhythm and normal heart sounds.    Pulmonary/Chest: Effort normal. He has wheezes in the right upper field, the right middle field, the right lower field, the left upper field, the left middle field and the left lower field.   Abdominal: Soft. There is no tenderness.   Musculoskeletal: Normal range of motion. He exhibits no edema or deformity.   Neurological: He is alert and oriented to  person, place, and time. He has normal strength.   Skin: Skin is warm.   Psychiatric: He has a normal mood and affect. His behavior is normal.   Nursing note and vitals reviewed.      Procedures         ED Course  ED Course      Patient was given nitroglycerin and albuterol.  Pain persisted.  Patient was then given fentanyl and started on a nitroglycerin drip.  Chest pain improving.  EKG: Normal sinus rhythm with a rate of 68, left ventricular hypertrophy, ST changes in the anterior leads with reciprocal changes in the lateral leads and inferior leads, fairly unchanged from previous EKG    Lab Results (last 24 hours)     Procedure Component Value Units Date/Time    CBC & Differential [[id number]] Collected:  11/19/17 2125    Specimen:  Blood Updated:  11/19/17 2138    Narrative:       The following orders were created for panel order CBC & Differential.  Procedure                               Abnormality         Status                     ---------                               -----------         ------                     CBC Auto Differential[[id number]]        Abnormal            Final result                 Please view results for these tests on the individual orders.    Comprehensive Metabolic Panel [[id number]]  (Abnormal) Collected:  11/19/17 2125    Specimen:  Blood Updated:  11/19/17 2146     Glucose 81 mg/dL      BUN 10 mg/dL      Creatinine 0.54 mg/dL      Sodium 142 mmol/L      Potassium 4.0 mmol/L      Chloride 105 mmol/L      CO2 28.0 mmol/L      Calcium 9.1 mg/dL      Total Protein 7.1 g/dL      Albumin 3.80 g/dL      ALT (SGPT) 109 (H) U/L      AST (SGOT) 105 (H) U/L      Alkaline Phosphatase 69 U/L      Total Bilirubin 0.5 mg/dL      eGFR Non African Amer >150 mL/min/1.73      Globulin 3.3 gm/dL      A/G Ratio 1.2 g/dL      BUN/Creatinine Ratio 18.5     Anion Gap 9.0 mmol/L     Troponin [[id number]]  (Abnormal) Collected:  11/19/17 2125    Specimen:  Blood Updated:  11/19/17 2157     Troponin I 0.075  (H) ng/mL     CBC Auto Differential [[id number]]  (Abnormal) Collected:  11/19/17 2125    Specimen:  Blood Updated:  11/19/17 2138     WBC 9.35 10*3/mm3      RBC 4.27 (L) 10*6/mm3      Hemoglobin 13.9 (L) g/dL      Hematocrit 41.3 %      MCV 96.7 (H) fL      MCH 32.6 (H) pg      MCHC 33.7 g/dL      RDW 13.8 %      RDW-SD 49.3 fl      MPV 11.5 fL      Platelets 165 10*3/mm3      Neutrophil % 57.7 %      Lymphocyte % 25.2 %      Monocyte % 11.7 %      Eosinophil % 4.8 (H) %      Basophil % 0.4 %      Immature Grans % 0.2 %      Neutrophils, Absolute 5.39 10*3/mm3      Lymphocytes, Absolute 2.36 10*3/mm3      Monocytes, Absolute 1.09 10*3/mm3      Eosinophils, Absolute 0.45 10*3/mm3      Basophils, Absolute 0.04 10*3/mm3      Immature Grans, Absolute 0.02 10*3/mm3     Protime-INR [[id number]]  (Normal) Collected:  11/19/17 2125    Specimen:  Blood Updated:  11/19/17 2154     Protime 14.2 Seconds      INR 1.07    aPTT [[id number]]  (Normal) Collected:  11/19/17 2125    Specimen:  Blood Updated:  11/19/17 2154     PTT 34.0 seconds     Digoxin Level [[id number]]  (Normal) Collected:  11/19/17 2125    Specimen:  Blood Updated:  11/19/17 2148     Digoxin 0.90 ng/mL         XR Chest 1 View   Final Result   1. Cardiomegaly without evidence of acute cardiopulmonary process.           This report was finalized on 11/19/2017 21:14 by Dr. Zachary Harris MD.        Labs showed elevated LFTs and troponin.  Chest x-ray showed cardiomegaly.  Patient is already taking Eliquis daily and denies missing any doses.  Discussed the case with Dr. Hwang with cardiology.  Patient was admitted to his service for further treatment.          MDM    Final diagnoses:   NSTEMI (non-ST elevated myocardial infarction)   Other mucopurulent conjunctivitis of both eyes            Mariel Leyva MD  11/19/17 3352

## 2017-11-20 NOTE — PROGRESS NOTES
Discharge Planning Assessment  [facility]     Patient Name: Juan A Chavez  MRN: [id number]  Today's Date: 11/20/2017    Admit Date: 11/19/2017          Discharge Needs Assessment       11/20/17 1006    Living Environment    Lives With spouse    Living Arrangements house    Type of Financial/Environmental Concern none    Transportation Available family or friend will provide    Living Environment    Provides Primary Care For no one    Able to Return to Prior Living Arrangements yes    Discharge Needs Assessment    Concerns To Be Addressed no discharge needs identified;denies needs/concerns at this time    Anticipated Changes Related to Illness none    Equipment Currently Used at Home oxygen    Equipment Needed After Discharge none    Discharge Disposition home or self-care            Discharge Plan       11/20/17 1007    Case Management/Social Work Plan    Plan PT resides at home with spouse and plans to dc to the same. PT is unaware of any dc needs at this time. Will follow.     Patient/Family In Agreement With Plan yes        Discharge Placement     No information found                Demographic Summary     None            Functional Status     None            Psychosocial     None            Abuse/Neglect     None            Legal     None            Substance Abuse     None            Patient Forms     None          SHANELL Myers

## 2017-11-20 NOTE — PLAN OF CARE
Problem: Patient Care Overview (Adult)  Goal: Plan of Care Review  Outcome: Ongoing (interventions implemented as appropriate)    11/20/17 1539   Coping/Psychosocial Response Interventions   Plan Of Care Reviewed With patient   Patient Care Overview   Progress improving   Outcome Evaluation   Outcome Summary/Follow up Plan NITRO WEANED OFF THIS AM. NO C/O CHEST PAIN, JUST C/O SOA. LEXISCAN DONE THIS AM. ONE TIME DOSE OF IV LASIX GIVEN. PERCOCET AND ULTRAM GIVEN FOR CHRONIC BACK PAIN. CONTINUE TO MONITOR.       Goal: Adult Individualization and Mutuality  Outcome: Ongoing (interventions implemented as appropriate)  Goal: Discharge Needs Assessment  Outcome: Ongoing (interventions implemented as appropriate)    Problem: Acute Coronary Syndrome (ACS) (Adult)  Goal: Signs and Symptoms of Listed Potential Problems Will be Absent or Manageable (Acute Coronary Syndrome)  Outcome: Ongoing (interventions implemented as appropriate)    Problem: Cardiac: Heart Failure (Adult)  Goal: Signs and Symptoms of Listed Potential Problems Will be Absent or Manageable (Cardiac: Heart Failure)  Outcome: Ongoing (interventions implemented as appropriate)

## 2017-11-20 NOTE — PLAN OF CARE
Problem: Patient Care Overview (Adult)  Goal: Plan of Care Review  Outcome: Ongoing (interventions implemented as appropriate)    11/20/17 0443   Coping/Psychosocial Response Interventions   Plan Of Care Reviewed With patient   Patient Care Overview   Progress no change   Outcome Evaluation   Outcome Summary/Follow up Plan Patient admitted from ER with chest pain. Nitro drip at 15mcg/min. PRN percocet given for back pain. NSR/AP on tele. NPO. Continue to monitor.        Goal: Adult Individualization and Mutuality  Outcome: Ongoing (interventions implemented as appropriate)  Goal: Discharge Needs Assessment  Outcome: Ongoing (interventions implemented as appropriate)    Problem: Acute Coronary Syndrome (ACS) (Adult)  Goal: Signs and Symptoms of Listed Potential Problems Will be Absent or Manageable (Acute Coronary Syndrome)  Outcome: Ongoing (interventions implemented as appropriate)

## 2017-11-21 LAB
ACT BLD: 180 SECONDS (ref 82–152)
ANION GAP SERPL CALCULATED.3IONS-SCNC: 9 MMOL/L (ref 4–13)
BUN BLD-MCNC: 17 MG/DL (ref 5–21)
BUN/CREAT SERPL: 29.8 (ref 7–25)
CALCIUM SPEC-SCNC: 8.9 MG/DL (ref 8.4–10.4)
CHLORIDE SERPL-SCNC: 106 MMOL/L (ref 98–110)
CO2 SERPL-SCNC: 29 MMOL/L (ref 24–31)
CREAT BLD-MCNC: 0.57 MG/DL (ref 0.5–1.4)
GFR SERPL CREATININE-BSD FRML MDRD: 149 ML/MIN/1.73
GLUCOSE BLD-MCNC: 104 MG/DL (ref 70–100)
POTASSIUM BLD-SCNC: 3.5 MMOL/L (ref 3.5–5.3)
SODIUM BLD-SCNC: 144 MMOL/L (ref 135–145)
TROPONIN I SERPL-MCNC: 0.03 NG/ML (ref 0–0.03)
TROPONIN I SERPL-MCNC: 0.04 NG/ML (ref 0–0.03)

## 2017-11-21 PROCEDURE — C1769 GUIDE WIRE: HCPCS | Performed by: INTERNAL MEDICINE

## 2017-11-21 PROCEDURE — C1887 CATHETER, GUIDING: HCPCS | Performed by: INTERNAL MEDICINE

## 2017-11-21 PROCEDURE — 99152 MOD SED SAME PHYS/QHP 5/>YRS: CPT | Performed by: INTERNAL MEDICINE

## 2017-11-21 PROCEDURE — 92937 PRQ TRLUML REVSC CAB GRF 1: CPT | Performed by: INTERNAL MEDICINE

## 2017-11-21 PROCEDURE — 93005 ELECTROCARDIOGRAM TRACING: CPT | Performed by: INTERNAL MEDICINE

## 2017-11-21 PROCEDURE — 25010000002 DIPHENHYDRAMINE PER 50 MG: Performed by: INTERNAL MEDICINE

## 2017-11-21 PROCEDURE — 80048 BASIC METABOLIC PNL TOTAL CA: CPT | Performed by: INTERNAL MEDICINE

## 2017-11-21 PROCEDURE — 93459 L HRT ART/GRFT ANGIO: CPT | Performed by: INTERNAL MEDICINE

## 2017-11-21 PROCEDURE — 4A023N7 MEASUREMENT OF CARDIAC SAMPLING AND PRESSURE, LEFT HEART, PERCUTANEOUS APPROACH: ICD-10-PCS | Performed by: INTERNAL MEDICINE

## 2017-11-21 PROCEDURE — 93010 ELECTROCARDIOGRAM REPORT: CPT | Performed by: INTERNAL MEDICINE

## 2017-11-21 PROCEDURE — C1725 CATH, TRANSLUMIN NON-LASER: HCPCS | Performed by: INTERNAL MEDICINE

## 2017-11-21 PROCEDURE — 25010000002 HEPARIN (PORCINE) PER 1000 UNITS: Performed by: INTERNAL MEDICINE

## 2017-11-21 PROCEDURE — C1884 EMBOLIZATION PROTECT SYST: HCPCS | Performed by: INTERNAL MEDICINE

## 2017-11-21 PROCEDURE — 25010000002 FENTANYL CITRATE (PF) 100 MCG/2ML SOLUTION: Performed by: INTERNAL MEDICINE

## 2017-11-21 PROCEDURE — 85347 COAGULATION TIME ACTIVATED: CPT

## 2017-11-21 PROCEDURE — C9604 PERC D-E COR REVASC T CABG S: HCPCS | Performed by: INTERNAL MEDICINE

## 2017-11-21 PROCEDURE — 94799 UNLISTED PULMONARY SVC/PX: CPT

## 2017-11-21 PROCEDURE — 93455 CORONARY ART/GRFT ANGIO S&I: CPT | Performed by: INTERNAL MEDICINE

## 2017-11-21 PROCEDURE — 25010000002 MIDAZOLAM PER 1 MG: Performed by: INTERNAL MEDICINE

## 2017-11-21 PROCEDURE — C1874 STENT, COATED/COV W/DEL SYS: HCPCS | Performed by: INTERNAL MEDICINE

## 2017-11-21 PROCEDURE — B2151ZZ FLUOROSCOPY OF LEFT HEART USING LOW OSMOLAR CONTRAST: ICD-10-PCS | Performed by: INTERNAL MEDICINE

## 2017-11-21 PROCEDURE — C1760 CLOSURE DEV, VASC: HCPCS | Performed by: INTERNAL MEDICINE

## 2017-11-21 PROCEDURE — 84484 ASSAY OF TROPONIN QUANT: CPT | Performed by: INTERNAL MEDICINE

## 2017-11-21 PROCEDURE — 027034Z DILATION OF CORONARY ARTERY, ONE ARTERY WITH DRUG-ELUTING INTRALUMINAL DEVICE, PERCUTANEOUS APPROACH: ICD-10-PCS | Performed by: INTERNAL MEDICINE

## 2017-11-21 PROCEDURE — B2111ZZ FLUOROSCOPY OF MULTIPLE CORONARY ARTERIES USING LOW OSMOLAR CONTRAST: ICD-10-PCS | Performed by: INTERNAL MEDICINE

## 2017-11-21 PROCEDURE — 25010000002 HYDROMORPHONE PER 4 MG: Performed by: INTERNAL MEDICINE

## 2017-11-21 PROCEDURE — C1894 INTRO/SHEATH, NON-LASER: HCPCS | Performed by: INTERNAL MEDICINE

## 2017-11-21 PROCEDURE — 0 IOPAMIDOL PER 1 ML: Performed by: INTERNAL MEDICINE

## 2017-11-21 DEVICE — XIENCE ALPINE EVEROLIMUS ELUTING CORONARY STENT SYSTEM 2.50 MM X 15 MM / RAPID-EXCHANGE
Type: IMPLANTABLE DEVICE | Status: FUNCTIONAL
Brand: XIENCE ALPINE

## 2017-11-21 RX ORDER — LIDOCAINE HYDROCHLORIDE 20 MG/ML
INJECTION, SOLUTION INFILTRATION; PERINEURAL AS NEEDED
Status: DISCONTINUED | OUTPATIENT
Start: 2017-11-21 | End: 2017-11-21 | Stop reason: HOSPADM

## 2017-11-21 RX ORDER — NITROGLYCERIN 5 MG/ML
INJECTION, SOLUTION INTRAVENOUS AS NEEDED
Status: DISCONTINUED | OUTPATIENT
Start: 2017-11-21 | End: 2017-11-21 | Stop reason: HOSPADM

## 2017-11-21 RX ORDER — LISINOPRIL 20 MG/1
40 TABLET ORAL DAILY
Status: DISCONTINUED | OUTPATIENT
Start: 2017-11-21 | End: 2017-11-22 | Stop reason: HOSPADM

## 2017-11-21 RX ORDER — OXYCODONE AND ACETAMINOPHEN 10; 325 MG/1; MG/1
1 TABLET ORAL EVERY 4 HOURS PRN
Status: DISCONTINUED | OUTPATIENT
Start: 2017-11-21 | End: 2017-11-22 | Stop reason: HOSPADM

## 2017-11-21 RX ORDER — ASPIRIN 325 MG
325 TABLET ORAL ONCE
Status: COMPLETED | OUTPATIENT
Start: 2017-11-21 | End: 2017-11-21

## 2017-11-21 RX ORDER — SODIUM CHLORIDE 9 MG/ML
100 INJECTION, SOLUTION INTRAVENOUS CONTINUOUS
Status: DISPENSED | OUTPATIENT
Start: 2017-11-21 | End: 2017-11-21

## 2017-11-21 RX ORDER — DIPHENHYDRAMINE HYDROCHLORIDE 50 MG/ML
INJECTION INTRAMUSCULAR; INTRAVENOUS AS NEEDED
Status: DISCONTINUED | OUTPATIENT
Start: 2017-11-21 | End: 2017-11-21 | Stop reason: HOSPADM

## 2017-11-21 RX ORDER — NICOTINE 21 MG/24HR
1 PATCH, TRANSDERMAL 24 HOURS TRANSDERMAL EVERY 24 HOURS
Status: DISCONTINUED | OUTPATIENT
Start: 2017-11-21 | End: 2017-11-22 | Stop reason: HOSPADM

## 2017-11-21 RX ORDER — FENTANYL CITRATE 50 UG/ML
INJECTION, SOLUTION INTRAMUSCULAR; INTRAVENOUS AS NEEDED
Status: DISCONTINUED | OUTPATIENT
Start: 2017-11-21 | End: 2017-11-21 | Stop reason: HOSPADM

## 2017-11-21 RX ORDER — MIDAZOLAM HYDROCHLORIDE 1 MG/ML
INJECTION INTRAMUSCULAR; INTRAVENOUS AS NEEDED
Status: DISCONTINUED | OUTPATIENT
Start: 2017-11-21 | End: 2017-11-21 | Stop reason: HOSPADM

## 2017-11-21 RX ORDER — HEPARIN SODIUM 1000 [USP'U]/ML
INJECTION, SOLUTION INTRAVENOUS; SUBCUTANEOUS AS NEEDED
Status: DISCONTINUED | OUTPATIENT
Start: 2017-11-21 | End: 2017-11-21 | Stop reason: HOSPADM

## 2017-11-21 RX ADMIN — NICOTINE 4 MG: 2 GUM, CHEWING ORAL at 15:11

## 2017-11-21 RX ADMIN — TRAMADOL HYDROCHLORIDE 50 MG: 50 TABLET, FILM COATED ORAL at 13:19

## 2017-11-21 RX ADMIN — OXYCODONE HYDROCHLORIDE AND ACETAMINOPHEN 1 TABLET: 10; 325 TABLET ORAL at 08:38

## 2017-11-21 RX ADMIN — HYDROMORPHONE HYDROCHLORIDE 1 MG: 1 INJECTION, SOLUTION INTRAMUSCULAR; INTRAVENOUS; SUBCUTANEOUS at 12:21

## 2017-11-21 RX ADMIN — DIGOXIN 250 MCG: 0.25 TABLET ORAL at 08:38

## 2017-11-21 RX ADMIN — SODIUM CHLORIDE 100 ML/HR: 9 INJECTION, SOLUTION INTRAVENOUS at 17:01

## 2017-11-21 RX ADMIN — CITALOPRAM 20 MG: 20 TABLET, FILM COATED ORAL at 17:01

## 2017-11-21 RX ADMIN — SODIUM CHLORIDE 100 ML/HR: 9 INJECTION, SOLUTION INTRAVENOUS at 13:18

## 2017-11-21 RX ADMIN — OXYCODONE HYDROCHLORIDE AND ACETAMINOPHEN 1 TABLET: 10; 325 TABLET ORAL at 23:03

## 2017-11-21 RX ADMIN — LISINOPRIL 40 MG: 20 TABLET ORAL at 08:38

## 2017-11-21 RX ADMIN — CARVEDILOL 6.25 MG: 6.25 TABLET, FILM COATED ORAL at 08:38

## 2017-11-21 RX ADMIN — IPRATROPIUM BROMIDE AND ALBUTEROL SULFATE 3 ML: 2.5; .5 SOLUTION RESPIRATORY (INHALATION) at 06:33

## 2017-11-21 RX ADMIN — OXYCODONE HYDROCHLORIDE AND ACETAMINOPHEN 1 TABLET: 10; 325 TABLET ORAL at 19:03

## 2017-11-21 RX ADMIN — SPIRONOLACTONE 25 MG: 25 TABLET ORAL at 08:38

## 2017-11-21 RX ADMIN — TRAMADOL HYDROCHLORIDE 50 MG: 50 TABLET, FILM COATED ORAL at 17:01

## 2017-11-21 RX ADMIN — OXYCODONE HYDROCHLORIDE AND ACETAMINOPHEN 1 TABLET: 10; 325 TABLET ORAL at 02:26

## 2017-11-21 RX ADMIN — OXYCODONE HYDROCHLORIDE AND ACETAMINOPHEN 1 TABLET: 10; 325 TABLET ORAL at 13:33

## 2017-11-21 RX ADMIN — NICOTINE 1 PATCH: 21 PATCH, EXTENDED RELEASE TRANSDERMAL at 15:10

## 2017-11-21 RX ADMIN — DESMOPRESSIN ACETATE 40 MG: 0.2 TABLET ORAL at 20:57

## 2017-11-21 RX ADMIN — IPRATROPIUM BROMIDE AND ALBUTEROL SULFATE 3 ML: 2.5; .5 SOLUTION RESPIRATORY (INHALATION) at 19:36

## 2017-11-21 RX ADMIN — TICAGRELOR 90 MG: 90 TABLET ORAL at 23:03

## 2017-11-21 RX ADMIN — PANTOPRAZOLE SODIUM 40 MG: 40 TABLET, DELAYED RELEASE ORAL at 08:40

## 2017-11-21 RX ADMIN — ASPIRIN 325 MG: 325 TABLET, COATED ORAL at 08:38

## 2017-11-21 RX ADMIN — CARVEDILOL 6.25 MG: 6.25 TABLET, FILM COATED ORAL at 17:01

## 2017-11-21 RX ADMIN — CITALOPRAM 20 MG: 20 TABLET, FILM COATED ORAL at 08:38

## 2017-11-21 NOTE — PLAN OF CARE
Problem: Patient Care Overview (Adult)  Goal: Plan of Care Review  Outcome: Ongoing (interventions implemented as appropriate)    11/21/17 0302   Coping/Psychosocial Response Interventions   Plan Of Care Reviewed With patient   Patient Care Overview   Progress improving   Outcome Evaluation   Outcome Summary/Follow up Plan VSS BP WNL SR/A paced on tele. NPO p MN for heart cath today. Consent signed and on chart. Percocet given PRN for back pain with relief Reassess as needed. Pending AM BMP        Goal: Adult Individualization and Mutuality  Outcome: Ongoing (interventions implemented as appropriate)  Goal: Discharge Needs Assessment  Outcome: Ongoing (interventions implemented as appropriate)    Problem: Acute Coronary Syndrome (ACS) (Adult)  Goal: Signs and Symptoms of Listed Potential Problems Will be Absent or Manageable (Acute Coronary Syndrome)  Outcome: Ongoing (interventions implemented as appropriate)    Problem: Cardiac: Heart Failure (Adult)  Goal: Signs and Symptoms of Listed Potential Problems Will be Absent or Manageable (Cardiac: Heart Failure)  Outcome: Ongoing (interventions implemented as appropriate)    Problem: Cardiac Catheterization with/without PCI (Adult)  Goal: Signs and Symptoms of Listed Potential Problems Will be Absent or Manageable (Cardiac Catheterization with/without PCI)  Outcome: Ongoing (interventions implemented as appropriate)

## 2017-11-21 NOTE — PLAN OF CARE
Problem: Patient Care Overview (Adult)  Goal: Plan of Care Review  Outcome: Ongoing (interventions implemented as appropriate)    11/21/17 1541   Coping/Psychosocial Response Interventions   Plan Of Care Reviewed With patient   Patient Care Overview   Progress improving   Outcome Evaluation   Outcome Summary/Follow up Plan HEART CATH TODAY WITH STENT PLACEMENT. RIGHT GROIN SOFT, DRSG C/D/I, PPP. C/O CHRONIC BACK/NECK PAIN. PERCOCET Q4H PRN. NICOTINE PATCH TO R ARM AND NICOTINE GUM PRN. PLANS FOR HOME TOMORROW.        Goal: Adult Individualization and Mutuality  Outcome: Ongoing (interventions implemented as appropriate)  Goal: Discharge Needs Assessment  Outcome: Ongoing (interventions implemented as appropriate)    Problem: Acute Coronary Syndrome (ACS) (Adult)  Goal: Signs and Symptoms of Listed Potential Problems Will be Absent or Manageable (Acute Coronary Syndrome)  Outcome: Ongoing (interventions implemented as appropriate)    Problem: Cardiac: Heart Failure (Adult)  Goal: Signs and Symptoms of Listed Potential Problems Will be Absent or Manageable (Cardiac: Heart Failure)  Outcome: Ongoing (interventions implemented as appropriate)    Problem: Cardiac Catheterization with/without PCI (Adult)  Goal: Signs and Symptoms of Listed Potential Problems Will be Absent or Manageable (Cardiac Catheterization with/without PCI)  Outcome: Ongoing (interventions implemented as appropriate)

## 2017-11-22 VITALS
TEMPERATURE: 97.1 F | SYSTOLIC BLOOD PRESSURE: 150 MMHG | WEIGHT: 218 LBS | BODY MASS INDEX: 30.52 KG/M2 | DIASTOLIC BLOOD PRESSURE: 82 MMHG | OXYGEN SATURATION: 97 % | HEART RATE: 67 BPM | HEIGHT: 71 IN | RESPIRATION RATE: 18 BRPM

## 2017-11-22 LAB
ANION GAP SERPL CALCULATED.3IONS-SCNC: 6 MMOL/L (ref 4–13)
BUN BLD-MCNC: 18 MG/DL (ref 5–21)
BUN/CREAT SERPL: 28.1 (ref 7–25)
CALCIUM SPEC-SCNC: 9 MG/DL (ref 8.4–10.4)
CHLORIDE SERPL-SCNC: 107 MMOL/L (ref 98–110)
CO2 SERPL-SCNC: 33 MMOL/L (ref 24–31)
CREAT BLD-MCNC: 0.64 MG/DL (ref 0.5–1.4)
DEPRECATED RDW RBC AUTO: 49.7 FL (ref 40–54)
ERYTHROCYTE [DISTWIDTH] IN BLOOD BY AUTOMATED COUNT: 14 % (ref 12–15)
GFR SERPL CREATININE-BSD FRML MDRD: 130 ML/MIN/1.73
GLUCOSE BLD-MCNC: 104 MG/DL (ref 70–100)
HCT VFR BLD AUTO: 41.2 % (ref 40–52)
HGB BLD-MCNC: 13.6 G/DL (ref 14–18)
MCH RBC QN AUTO: 32.5 PG (ref 28–32)
MCHC RBC AUTO-ENTMCNC: 33 G/DL (ref 33–36)
MCV RBC AUTO: 98.3 FL (ref 82–95)
PLATELET # BLD AUTO: 146 10*3/MM3 (ref 130–400)
PMV BLD AUTO: 11.7 FL (ref 6–12)
POTASSIUM BLD-SCNC: 3.9 MMOL/L (ref 3.5–5.3)
RBC # BLD AUTO: 4.19 10*6/MM3 (ref 4.8–5.9)
SODIUM BLD-SCNC: 146 MMOL/L (ref 135–145)
WBC NRBC COR # BLD: 6.53 10*3/MM3 (ref 4.8–10.8)

## 2017-11-22 PROCEDURE — 94799 UNLISTED PULMONARY SVC/PX: CPT

## 2017-11-22 PROCEDURE — 80048 BASIC METABOLIC PNL TOTAL CA: CPT | Performed by: INTERNAL MEDICINE

## 2017-11-22 PROCEDURE — 85027 COMPLETE CBC AUTOMATED: CPT | Performed by: INTERNAL MEDICINE

## 2017-11-22 PROCEDURE — 99238 HOSP IP/OBS DSCHRG MGMT 30/<: CPT | Performed by: INTERNAL MEDICINE

## 2017-11-22 RX ORDER — ATORVASTATIN CALCIUM 40 MG/1
40 TABLET, FILM COATED ORAL NIGHTLY
Qty: 30 TABLET | Refills: 11 | Status: SHIPPED | OUTPATIENT
Start: 2017-11-22 | End: 2019-06-26 | Stop reason: SDUPTHER

## 2017-11-22 RX ORDER — ASPIRIN 81 MG/1
81 TABLET, CHEWABLE ORAL DAILY
Qty: 30 TABLET | Refills: 11 | Status: SHIPPED | OUTPATIENT
Start: 2017-11-22 | End: 2018-01-18

## 2017-11-22 RX ORDER — NICOTINE 21 MG/24HR
1 PATCH, TRANSDERMAL 24 HOURS TRANSDERMAL EVERY 24 HOURS
Qty: 28 PATCH | Refills: 5 | Status: SHIPPED | OUTPATIENT
Start: 2017-11-22 | End: 2018-01-18

## 2017-11-22 RX ORDER — HYDROCODONE BITARTRATE AND ACETAMINOPHEN 7.5; 325 MG/1; MG/1
1 TABLET ORAL EVERY 6 HOURS PRN
Qty: 10 TABLET | Refills: 0 | Status: SHIPPED | OUTPATIENT
Start: 2017-11-22 | End: 2019-01-10

## 2017-11-22 RX ORDER — LISINOPRIL 40 MG/1
40 TABLET ORAL DAILY
Qty: 30 TABLET | Refills: 11 | Status: SHIPPED | OUTPATIENT
Start: 2017-11-22

## 2017-11-22 RX ORDER — NITROGLYCERIN 0.4 MG/1
0.4 TABLET SUBLINGUAL
Qty: 25 TABLET | Refills: 12 | Status: SHIPPED | OUTPATIENT
Start: 2017-11-22 | End: 2018-01-18

## 2017-11-22 RX ORDER — SPIRONOLACTONE 25 MG/1
25 TABLET ORAL DAILY
Qty: 30 TABLET | Refills: 11 | Status: SHIPPED | OUTPATIENT
Start: 2017-11-22 | End: 2019-01-10 | Stop reason: ALTCHOICE

## 2017-11-22 RX ADMIN — PANTOPRAZOLE SODIUM 40 MG: 40 TABLET, DELAYED RELEASE ORAL at 08:05

## 2017-11-22 RX ADMIN — CITALOPRAM 20 MG: 20 TABLET, FILM COATED ORAL at 08:06

## 2017-11-22 RX ADMIN — DIGOXIN 250 MCG: 0.25 TABLET ORAL at 08:05

## 2017-11-22 RX ADMIN — Medication 81 MG: at 08:06

## 2017-11-22 RX ADMIN — CARVEDILOL 6.25 MG: 6.25 TABLET, FILM COATED ORAL at 08:06

## 2017-11-22 RX ADMIN — OXYCODONE HYDROCHLORIDE AND ACETAMINOPHEN 1 TABLET: 10; 325 TABLET ORAL at 08:06

## 2017-11-22 RX ADMIN — OXYCODONE HYDROCHLORIDE AND ACETAMINOPHEN 1 TABLET: 10; 325 TABLET ORAL at 04:19

## 2017-11-22 RX ADMIN — IPRATROPIUM BROMIDE AND ALBUTEROL SULFATE 3 ML: 2.5; .5 SOLUTION RESPIRATORY (INHALATION) at 06:31

## 2017-11-22 RX ADMIN — TICAGRELOR 90 MG: 90 TABLET ORAL at 08:05

## 2017-11-22 RX ADMIN — LISINOPRIL 40 MG: 20 TABLET ORAL at 08:06

## 2017-11-22 NOTE — PLAN OF CARE
Problem: Patient Care Overview (Adult)  Goal: Plan of Care Review  Outcome: Ongoing (interventions implemented as appropriate)    11/22/17 0334   Coping/Psychosocial Response Interventions   Plan Of Care Reviewed With patient   Patient Care Overview   Progress improving   Outcome Evaluation   Outcome Summary/Follow up Plan VSS. A-paced/NSR 60-62 on tele. C/o chronic back pain, medicated with PRN Percocet with relief obtained. Right groin C/D/I, soft, non-tender to palpation. Labs pending. Poss DC home this AM. Will cont to monitor.         Problem: Acute Coronary Syndrome (ACS) (Adult)  Goal: Signs and Symptoms of Listed Potential Problems Will be Absent or Manageable (Acute Coronary Syndrome)  Outcome: Ongoing (interventions implemented as appropriate)    Problem: Cardiac: Heart Failure (Adult)  Goal: Signs and Symptoms of Listed Potential Problems Will be Absent or Manageable (Cardiac: Heart Failure)  Outcome: Ongoing (interventions implemented as appropriate)    Problem: Cardiac Catheterization with/without PCI (Adult)  Goal: Signs and Symptoms of Listed Potential Problems Will be Absent or Manageable (Cardiac Catheterization with/without PCI)  Outcome: Outcome(s) achieved Date Met:  11/22/17

## 2017-11-22 NOTE — DISCHARGE SUMMARY
Date of admission: 11/19/2017    Date of discharge: 11/22/2017    Clinical service: Cardiology    Attending physician: Pradeep Hwang    Final discharge diagnosis:  1.  Unstable angina  2.  Coronary artery disease  3.  Acute on chronic systolic congestive heart failure  4.  Tobacco abuse  5.  Hypertension  6.  COPD  7.  Dyslipidemia  8.  Paroxysmal atrial fibrillation  AICD in place    Past medical history:  1.  Chronic systolic congestive heart failure  2.  Coronary artery disease status post three-vessel CABG (2013) with LIMA to diagonal, SVG Y graft to OM 1 and OM 2/LPL  3.  Paroxysmal atrial fibrillation  4.  GERD  5.  COPD  6.  Tobacco abuse  7.  Essential hypertension  8.  Dyslipidemia  9.  Anxiety number  10.  AICD in place    Procedures:   - Nuclear stress (11/20/2017) EF 33%, fixed inferior defect consistent with scar, small size, mild intensity reversible defect in the mid to distal anterior septal region concerning for ischemia  - LHC (11/21/2017) severe 2 vessel native CAD, patent LIMA to D1, patent SVG Y graft to OM 1 and OM 2/LPL with severe stenosis of native OM 2 just distal to the anastomotic site of the vein graft, status post PCI with 2.5 x 15 mm Xience AMAYA  - ProBNP (11/19/2017) 2640  - Lipid panel (11/20/2017) total cholesterol 89, HDL 17, LDL 61, triglycerides 108    Previous cardiac testing and procedures:  - CABG (2013) at [facility].    - Nuclear stress (5/28/14) fixed inferior defect consistent with scar, negative for ischemia, EF 35-40%.   - Echo (7/19/14) mild to moderately enlarged LV, EF 30-35%  - Lipid panel (2/15/16) 95/28/52/123  - BNP (02/14/2016) 2020  - Echo (3/28/2017) EF 25-30%, severe LV dilation, grade I diastolic dysfunction, basal anteroseptal and basal to mid inferoseptal akinesis, mild MR, mild LA dilation, mild MR and TR.    Reason for admission:  Patient is a 52-year-old male with above-mentioned past medical history who presented to [facility] with chest  pain and shortness of breath.  He was transferred to [facility] for further evaluation and management.  He was admitted to the cardiology service.    Hospital course:  Patient was admitted to the telemetry floor.  He had serial troponins drawn which demonstrated a very mild elevation at 0.075.  He underwent a nuclear stress test which demonstrated areas of ischemia.  Options were discussed and patient elected for cardiac catheterization for further evaluation.  This demonstrated a severe stenosis in the second obtuse marginal branch just distal to the anastomotic site of the vein graft.  He underwent PCI successfully without complication.  Brilinta and aspirin were added for dual antiplatelet therapy.  Patient had a very mildly elevated BNP above his baseline as well as dyspnea.  He was given one time dose of IV Lasix.  He also had significant wheezing.  Started on nebulizer treatments and given prednisone during admission.  Patient denies having any formal diagnosis of COPD and was recommended that he have full PFTs done after discharge at follow-up.  Patient was counseled extensively on the importance of smoking cessation.  He was started on nicotine replacement therapy during admission and this was continued at discharge.  Was noted that patient went down and smoke multiple times during hospital course.  He was counseled on the importance of regular follow-up.  He will be referred to cardiac rehabilitation following discharge.    Discharge instructions:  1.  Activity: No lifting greater than 5 pounds for one week    2.  Diet: Cardiac    3.  Follow-up:   - Dr Hwang in one month  - PCP in one week    4.  Discharge medications:   Juan A Chavez   Home Medication Instructions TIERA:[id number]    Printed on:11/22/17 6097   Medication Information                      amoxicillin-clavulanate (AUGMENTIN) 875-125 MG per tablet  Take 1 tablet by mouth 2 (Two) Times a Day. 1 tablet twice a day for 7 days. Started taking  on 11/15/2017.             aspirin 81 MG chewable tablet  Chew 1 tablet Daily.             atorvastatin (LIPITOR) 40 MG tablet  Take 1 tablet by mouth Every Night.             carvedilol (COREG) 6.25 MG tablet  Take 1 tablet by mouth 2 (Two) Times a Day.             citalopram (CeleXA) 20 MG tablet  Take 40 mg by mouth Daily.             difluprednate (DUREZOL) 0.05 % ophthalmic emulsion  Administer 1 drop into the left eye 3 (Three) Times a Day.             DIGITEK 250 MCG tablet  Take 250 mcg by mouth Daily.             ELIQUIS 5 MG tablet tablet  Take 1 tablet by mouth 2 (Two) Times a Day.             furosemide (LASIX) 40 MG tablet  Take 1 tablet by mouth Daily.             HYDROcodone-acetaminophen (NORCO) 7.5-325 MG per tablet  Take 1 tablet by mouth Every 6 (Six) Hours As Needed for Moderate Pain .             hydrOXYzine (VISTARIL) 50 MG capsule  Take 50 mg by mouth Daily.             lisinopril (PRINIVIL,ZESTRIL) 40 MG tablet  Take 1 tablet by mouth Daily.             nicotine (NICODERM CQ) 21 MG/24HR patch  Place 1 patch on the skin Daily.             nicotine polacrilex (NICORETTE) 4 MG gum  Chew 1 each Every 1 (One) Hour As Needed for Smoking Cessation.             nitroglycerin (NITROSTAT) 0.4 MG SL tablet  Place 1 tablet under the tongue Every 5 (Five) Minutes As Needed for Chest Pain for up to 1 dose. Take no more than 3 doses in 15 minutes.             oxyCODONE-acetaminophen (PERCOCET)  MG per tablet  Take  tablets by mouth 4 (Four) Times a Day.             pantoprazole (PROTONIX) 40 MG EC tablet  Take 40 mg by mouth Daily.             spironolactone (ALDACTONE) 25 MG tablet  Take 1 tablet by mouth Daily.             ticagrelor (BRILINTA) 90 MG tablet tablet  Take 1 tablet by mouth Every 12 (Twelve) Hours.             traMADol (ULTRAM) 50 MG tablet  Take 50 mg by mouth Every 8 (Eight) Hours As Needed for Moderate Pain .

## 2017-12-09 ENCOUNTER — HOSPITAL ENCOUNTER (OUTPATIENT)
Facility: HOSPITAL | Age: 52
Setting detail: OBSERVATION
Discharge: HOME | End: 2017-12-09
Attending: EMERGENCY MEDICINE | Admitting: INTERNAL MEDICINE

## 2017-12-09 VITALS
WEIGHT: 217.3 LBS | DIASTOLIC BLOOD PRESSURE: 70 MMHG | RESPIRATION RATE: 18 BRPM | SYSTOLIC BLOOD PRESSURE: 134 MMHG | TEMPERATURE: 97.2 F | BODY MASS INDEX: 30.42 KG/M2 | HEIGHT: 71 IN | HEART RATE: 71 BPM | OXYGEN SATURATION: 99 %

## 2017-12-09 DIAGNOSIS — R07.9 CHEST PAIN IN ADULT: Primary | ICD-10-CM

## 2017-12-09 PROBLEM — Z95.5 S/P CORONARY ARTERY STENT PLACEMENT: Status: ACTIVE | Noted: 2017-12-09

## 2017-12-09 PROBLEM — Z95.1 S/P CABG X 3: Status: ACTIVE | Noted: 2017-12-09

## 2017-12-09 PROBLEM — G89.29 OTHER CHRONIC PAIN: Status: ACTIVE | Noted: 2017-12-09

## 2017-12-09 PROBLEM — I25.2 HISTORY OF NON-ST ELEVATION MYOCARDIAL INFARCTION (NSTEMI): Status: ACTIVE | Noted: 2017-11-19

## 2017-12-09 PROBLEM — M25.512 CHRONIC LEFT SHOULDER PAIN: Status: ACTIVE | Noted: 2017-12-09

## 2017-12-09 PROBLEM — G89.29 CHRONIC LEFT SHOULDER PAIN: Status: ACTIVE | Noted: 2017-12-09

## 2017-12-09 LAB
DIGOXIN SERPL-MCNC: 0.7 NG/ML (ref 0.8–2)
NT-PROBNP SERPL-MCNC: 1860 PG/ML (ref 0–900)
TROPONIN I SERPL-MCNC: 0.03 NG/ML (ref 0–0.03)

## 2017-12-09 PROCEDURE — 80162 ASSAY OF DIGOXIN TOTAL: CPT | Performed by: EMERGENCY MEDICINE

## 2017-12-09 PROCEDURE — 93005 ELECTROCARDIOGRAM TRACING: CPT

## 2017-12-09 PROCEDURE — 25010000002 ONDANSETRON PER 1 MG: Performed by: EMERGENCY MEDICINE

## 2017-12-09 PROCEDURE — 93010 ELECTROCARDIOGRAM REPORT: CPT | Performed by: INTERNAL MEDICINE

## 2017-12-09 PROCEDURE — 99235 HOSP IP/OBS SAME DATE MOD 70: CPT | Performed by: INTERNAL MEDICINE

## 2017-12-09 PROCEDURE — G0378 HOSPITAL OBSERVATION PER HR: HCPCS

## 2017-12-09 PROCEDURE — 96375 TX/PRO/DX INJ NEW DRUG ADDON: CPT

## 2017-12-09 PROCEDURE — 99285 EMERGENCY DEPT VISIT HI MDM: CPT

## 2017-12-09 PROCEDURE — 83880 ASSAY OF NATRIURETIC PEPTIDE: CPT | Performed by: NURSE PRACTITIONER

## 2017-12-09 PROCEDURE — 84484 ASSAY OF TROPONIN QUANT: CPT | Performed by: EMERGENCY MEDICINE

## 2017-12-09 PROCEDURE — 25010000002 HYDROMORPHONE PER 4 MG: Performed by: EMERGENCY MEDICINE

## 2017-12-09 PROCEDURE — 96374 THER/PROPH/DIAG INJ IV PUSH: CPT

## 2017-12-09 RX ORDER — HYDROXYZINE PAMOATE 50 MG/1
50 CAPSULE ORAL DAILY
Status: DISCONTINUED | OUTPATIENT
Start: 2017-12-09 | End: 2017-12-09

## 2017-12-09 RX ORDER — DIFLUPREDNATE OPHTHALMIC 0.5 MG/ML
1 EMULSION OPHTHALMIC 3 TIMES DAILY
Status: DISCONTINUED | OUTPATIENT
Start: 2017-12-09 | End: 2017-12-09

## 2017-12-09 RX ORDER — PANTOPRAZOLE SODIUM 40 MG/1
40 TABLET, DELAYED RELEASE ORAL DAILY
Status: DISCONTINUED | OUTPATIENT
Start: 2017-12-09 | End: 2017-12-09 | Stop reason: HOSPADM

## 2017-12-09 RX ORDER — SODIUM CHLORIDE 0.9 % (FLUSH) 0.9 %
1-10 SYRINGE (ML) INJECTION AS NEEDED
Status: DISCONTINUED | OUTPATIENT
Start: 2017-12-09 | End: 2017-12-09 | Stop reason: HOSPADM

## 2017-12-09 RX ORDER — NICOTINE 21 MG/24HR
1 PATCH, TRANSDERMAL 24 HOURS TRANSDERMAL EVERY 24 HOURS
Status: DISCONTINUED | OUTPATIENT
Start: 2017-12-09 | End: 2017-12-09 | Stop reason: HOSPADM

## 2017-12-09 RX ORDER — FUROSEMIDE 40 MG/1
40 TABLET ORAL DAILY
Status: DISCONTINUED | OUTPATIENT
Start: 2017-12-09 | End: 2017-12-09 | Stop reason: HOSPADM

## 2017-12-09 RX ORDER — ATORVASTATIN CALCIUM 40 MG/1
40 TABLET, FILM COATED ORAL NIGHTLY
Status: DISCONTINUED | OUTPATIENT
Start: 2017-12-09 | End: 2017-12-09 | Stop reason: HOSPADM

## 2017-12-09 RX ORDER — CITALOPRAM 20 MG/1
40 TABLET ORAL DAILY
Status: DISCONTINUED | OUTPATIENT
Start: 2017-12-09 | End: 2017-12-09 | Stop reason: HOSPADM

## 2017-12-09 RX ORDER — ASPIRIN 81 MG/1
81 TABLET, CHEWABLE ORAL DAILY
Status: DISCONTINUED | OUTPATIENT
Start: 2017-12-09 | End: 2017-12-09 | Stop reason: HOSPADM

## 2017-12-09 RX ORDER — ONDANSETRON 2 MG/ML
4 INJECTION INTRAMUSCULAR; INTRAVENOUS ONCE
Status: COMPLETED | OUTPATIENT
Start: 2017-12-09 | End: 2017-12-09

## 2017-12-09 RX ORDER — CARVEDILOL 6.25 MG/1
6.25 TABLET ORAL EVERY 12 HOURS SCHEDULED
Status: DISCONTINUED | OUTPATIENT
Start: 2017-12-09 | End: 2017-12-09 | Stop reason: HOSPADM

## 2017-12-09 RX ORDER — TRAMADOL HYDROCHLORIDE 50 MG/1
100 TABLET ORAL EVERY 8 HOURS PRN
Status: DISCONTINUED | OUTPATIENT
Start: 2017-12-09 | End: 2017-12-09 | Stop reason: HOSPADM

## 2017-12-09 RX ORDER — NITROGLYCERIN 0.4 MG/1
0.4 TABLET SUBLINGUAL
Status: DISCONTINUED | OUTPATIENT
Start: 2017-12-09 | End: 2017-12-09 | Stop reason: HOSPADM

## 2017-12-09 RX ORDER — SPIRONOLACTONE 25 MG/1
25 TABLET ORAL DAILY
Status: DISCONTINUED | OUTPATIENT
Start: 2017-12-09 | End: 2017-12-09 | Stop reason: HOSPADM

## 2017-12-09 RX ORDER — LISINOPRIL 20 MG/1
40 TABLET ORAL DAILY
Status: DISCONTINUED | OUTPATIENT
Start: 2017-12-09 | End: 2017-12-09 | Stop reason: HOSPADM

## 2017-12-09 RX ORDER — DIGOXIN 250 MCG
250 TABLET ORAL DAILY
Status: DISCONTINUED | OUTPATIENT
Start: 2017-12-09 | End: 2017-12-09 | Stop reason: HOSPADM

## 2017-12-09 RX ADMIN — HYDROMORPHONE HYDROCHLORIDE 1 MG: 1 INJECTION, SOLUTION INTRAMUSCULAR; INTRAVENOUS; SUBCUTANEOUS at 01:00

## 2017-12-09 RX ADMIN — TRAMADOL HYDROCHLORIDE 100 MG: 50 TABLET, COATED ORAL at 03:38

## 2017-12-09 RX ADMIN — ONDANSETRON 4 MG: 2 INJECTION, SOLUTION INTRAMUSCULAR; INTRAVENOUS at 01:02

## 2017-12-09 NOTE — PLAN OF CARE
Problem: Patient Care Overview (Adult)  Goal: Plan of Care Review  Outcome: Ongoing (interventions implemented as appropriate)    12/09/17 0421   Outcome Evaluation   Outcome Summary/Follow up Plan Pt admitted as transferred from [facility] with c/o CP and Troponin there 0.09. Troponin 0.026 here. Dilaudid IV given in ER once and Dr Mcclure called for orders and ordered Tramadol home med PRN. No other orders at this time. Pt resting quietly Continue to monitor.        Goal: Adult Individualization and Mutuality  Outcome: Ongoing (interventions implemented as appropriate)  Goal: Discharge Needs Assessment  Outcome: Ongoing (interventions implemented as appropriate)    Problem: Acute Coronary Syndrome (ACS) (Adult)  Goal: Signs and Symptoms of Listed Potential Problems Will be Absent or Manageable (Acute Coronary Syndrome)  Outcome: Ongoing (interventions implemented as appropriate)    Problem: Cardiac: Heart Failure (Adult)  Goal: Signs and Symptoms of Listed Potential Problems Will be Absent or Manageable (Cardiac: Heart Failure)  Outcome: Ongoing (interventions implemented as appropriate)

## 2017-12-09 NOTE — DISCHARGE INSTRUCTIONS
Resume normal activity as tolerated.  Follow up with PCP for chronic pain issues.  Keep scheduled follow up with Dr. Hwang on 12/21/17.  Report any worsening symptoms.

## 2017-12-09 NOTE — ED PROVIDER NOTES
"Subjective   HPI Comments: Patient is a 52-year-old male who reports that he has history of hypertension and heart disease (with history of CABG) with a most recent heart stent 3 weeks ago by Dr. Hwang.  Patient reports that he began having \"sharp\" left-sided chest pain radiating to the left side of his neck and his left arm with associated shortness of breath at 7 PM tonight.  Patient reports this was worsened by nothing and relieved by nothing.  He reports his chest pain currently is a 6 (out of 10) and at its worse is an 8 (out of 10).  [facility] because of his chest pain.      History provided by:  Patient      Review of Systems   Constitutional: Negative.    HENT: Negative.    Eyes: Negative.    Respiratory: Positive for shortness of breath.    Cardiovascular: Positive for chest pain.   Gastrointestinal: Negative.    Endocrine: Negative.    Genitourinary: Negative.    Musculoskeletal: Negative.    Skin: Negative.    Allergic/Immunologic: Negative.    Neurological: Negative.    Hematological: Negative.    Psychiatric/Behavioral: Negative.    All other systems reviewed and are negative.      Past Medical History:   Diagnosis Date   • AICD (automatic cardioverter/defibrillator) present    • Anxiety    • Atrial fibrillation and flutter    • Back pain    • Chest pain    • CHF (congestive heart failure)     ECHO 3/14 EF 15-20%.   • Chronic clinical systolic heart failure     EF 35% 08/14   • COPD (chronic obstructive pulmonary disease)    • Coronary atherosclerosis    • Depression    • Hyperlipidemia    • Hypertension    • PTSD (post-traumatic stress disorder)    • Situational depression    • Tobacco abuse        Allergies   Allergen Reactions   • Morphine And Related Rash       Past Surgical History:   Procedure Laterality Date   • CARDIAC CATHETERIZATION     • CARDIAC CATHETERIZATION N/A " 11/21/2017    Procedure: Left Heart Cath;  Surgeon: Pradeep Hwang MD;  Location:  [facility] CATH INVASIVE LOCATION;  Service:    • CARDIAC CATHETERIZATION N/A 11/21/2017    Procedure: Bypass graft study;  Surgeon: Pradeep Hwang MD;  Location:  [facility] CATH INVASIVE LOCATION;  Service:    • CORONARY ARTERY BYPASS GRAFT  2013   • HERNIA REPAIR     • NECK SURGERY      for fusion   • MA RT/LT HEART CATHETERS N/A 11/21/2017    Procedure: Percutaneous Coronary Intervention;  Surgeon: Morgan Cruz MD;  Location:  [facility] CATH INVASIVE LOCATION;  Service: Cardiovascular       Family History   Problem Relation Age of Onset   • Hypertension Other    • Heart disease Other    • Hyperlipidemia Other    • Heart disease Mother    • Stroke Mother    • No Known Problems Father        Social History     Social History   • Marital status:      Spouse name: N/A   • Number of children: N/A   • Years of education: N/A     Social History Main Topics   • Smoking status: Current Every Day Smoker     Packs/day: 1.50     Types: Cigarettes   • Smokeless tobacco: Never Used   • Alcohol use Yes   • Drug use: Yes     Special: Marijuana   • Sexual activity: Not Asked     Other Topics Concern   • None     Social History Narrative           Objective   Physical Exam   Constitutional: He is oriented to person, place, and time. He appears well-developed and well-nourished.   HENT:   Head: Normocephalic and atraumatic.   Right Ear: External ear normal.   Left Ear: External ear normal.   Nose: Nose normal.   Mouth/Throat: Oropharynx is clear and moist.   Eyes: Conjunctivae and EOM are normal. Pupils are equal, round, and reactive to light. Right eye exhibits no discharge. Left eye exhibits no discharge.   Neck: Normal range of motion. Neck supple. No tracheal deviation present. No thyromegaly present.   Cardiovascular: Normal rate, regular rhythm, normal heart sounds and intact distal pulses.    No murmur heard.  Pulmonary/Chest: Effort  normal and breath sounds normal. No respiratory distress. He exhibits no tenderness.   Abdominal: Soft. He exhibits no distension. There is no tenderness.   Moderate obesity   Musculoskeletal: Normal range of motion. He exhibits no edema, tenderness or deformity.   Neurological: He is alert and oriented to person, place, and time. No cranial nerve deficit.   Skin: Skin is warm and dry. No erythema. No pallor.   Psychiatric: He has a normal mood and affect. Judgment normal.   Nursing note and vitals reviewed.      Procedures         ED Course  ED Course   Comment By Time   The patient requested Dilaudid and reports that he has taken this previously with no problems. Abraham Orantes MD 12/09 0047   Patient's case was discussed with Dr. Macias and he agrees to admit the patient. Abraham Orantes MD 12/09 0214                  MDM  Number of Diagnoses or Management Options  Chest pain in adult: new and requires workup     Amount and/or Complexity of Data Reviewed  Clinical lab tests: ordered and reviewed  Discuss the patient with other providers: yes    Risk of Complications, Morbidity, and/or Mortality  Presenting problems: moderate  Diagnostic procedures: moderate  Management options: moderate    Patient Progress  Patient progress: stable      Final diagnoses:   Chest pain in adult            Abraham Orantes MD  12/09/17 0519

## 2017-12-09 NOTE — H&P
"[facility] HISTORY AND PHYSICAL & DISCHARGE SUMMARY      Patient Care Team:  Juan A Elias MD as PCP - General (Family Medicine)    Chief complaint: Chest Pain    Subjective     Juan A Chavez is a 52 y.o. male transferred from [facility] with left shoulder pain radiating to the left arm and shortness of breath. The pain is worse with movement. He denies palpitations, dizziness, syncope, orthopnea, PND or swelling. EKG on presentation reveals atrial paced rhythm with left bundle branch block and T wave inversions present on previous EKG. Troponin level at [facility] slightly elevated at 0.09 with ULN being 0.06. Troponin level at [facility] normal. BNP slightly elevated at 1080. Other labs unremarkable. He has a history of coronary artery disease s/p coronary artery bypass grafting X3 and subsequent drug-eluting stent placement to saphenous vein graft to obtuse marginal branch 11/21/17, chronic systolic congestive heart failure s/p automatic internal cardiac defibrillator/pacemaker, chronic obstructive pulmonary disease on home oxygen, paroxysmal atrial fibrillation, hyperlipidemia, hypertension, post-traumatic stress disorder, chronic neck and back pain and tobacco abuse.     Pt to be discharged home today. He will continue on previously prescribed medications. Recommend follow up with PCP on Monday regarding chronic neck, back and left shoulder pain. He will keep scheduled follow up with Dr. Hwang on 12/21/17. Pt reports being lost to follow-up for AICD/pacemaker interrogation. He has a [employer] device that was followed by Dr. Blackmon in the past. He reports it has not been interrogated \"in a long time\". Needs interrogation at next office follow up.     Of note, pt became angry and belligerent when additional narcotics were refused. He demanded nursing staff to remove his telemetry monitor and IV and stated \"tell that lady she better have my shit ready\" (referring to discharge " paperwork). Threatened to leave AMA.     Review of Systems  Review of Systems   Constitution: Negative for chills, decreased appetite, fever, weakness, malaise/fatigue, night sweats, weight gain and weight loss.   HENT: Negative for nosebleeds.    Eyes: Negative for visual disturbance.   Cardiovascular: Negative for chest pain, dyspnea on exertion, leg swelling, near-syncope, orthopnea, palpitations, paroxysmal nocturnal dyspnea and syncope.   Respiratory: Positive for shortness of breath. Negative for cough, hemoptysis, snoring and wheezing.    Endocrine: Negative for cold intolerance and heat intolerance.   Hematologic/Lymphatic: Does not bruise/bleed easily.   Skin: Negative for rash.   Musculoskeletal: Positive for back pain, joint pain (Left shoulder) and neck pain. Negative for falls.   Gastrointestinal: Negative for abdominal pain, change in bowel habit, constipation, diarrhea, dysphagia, heartburn, nausea and vomiting.   Genitourinary: Negative for hematuria.   Neurological: Negative for dizziness, headaches and light-headedness.   Psychiatric/Behavioral: Negative for altered mental status.   Allergic/Immunologic: Negative for persistent infections.        History  Past Medical History:   Diagnosis Date   • AICD (automatic cardioverter/defibrillator) present    • Anxiety    • Arthritis    • Atrial fibrillation and flutter    • Back pain    • Chest pain    • CHF (congestive heart failure)     ECHO 3/14 EF 15-20%.   • Chronic clinical systolic heart failure     EF 35% 08/14   • COPD (chronic obstructive pulmonary disease)    • Coronary atherosclerosis    • Depression    • History of transfusion    • Hyperlipidemia    • Hypertension    • PTSD (post-traumatic stress disorder)    • Situational depression    • Tobacco abuse      Past Surgical History:   Procedure Laterality Date   • BACK SURGERY     • CARDIAC CATHETERIZATION     • CARDIAC CATHETERIZATION N/A 11/21/2017    Procedure: Left Heart Cath;  Surgeon:  Pradeep Hwang MD;  Location:  [facility] CATH [facility]VAS[facility] LOCATION;  Service:    • CARDIAC CATHETERIZATION N/A 11/21/2017    Procedure: Bypass graft study;  Surgeon: Pradeep Hwang MD;  Location:  [facility] CATH INVASIVE LOCATION;  Service:    • CARDIAC SURGERY     • CORONARY ARTERY BYPASS GRAFT  2013   • HERNIA REPAIR      abdominal   • NECK SURGERY      for fusion   • ME RT/LT HEART CATHETERS N/A 11/21/2017    Procedure: Percutaneous Coronary Intervention;  Surgeon: Morgan Cruz MD;  Location:  [facility] CATH INVASIVE LOCATION;  Service: Cardiovascular   • TONSILLECTOMY       Family History   Problem Relation Age of Onset   • Hypertension Other    • Heart disease Other    • Hyperlipidemia Other    • Heart disease Mother    • Stroke Mother    • No Known Problems Father      Social History   Substance Use Topics   • Smoking status: Current Every Day Smoker     Packs/day: 1.50     Years: 30.00     Types: Cigarettes   • Smokeless tobacco: Never Used   • Alcohol use Yes      Comment: beer 2-3 times a week       Medications  Prior to Admission medications    Medication Sig Start Date End Date Taking? Authorizing Provider   aspirin 81 MG chewable tablet Chew 1 tablet Daily. 11/22/17  Yes Pradeep Hwang MD   atorvastatin (LIPITOR) 40 MG tablet Take 1 tablet by mouth Every Night. 11/22/17  Yes Pradeep Hwang MD   carvedilol (COREG) 6.25 MG tablet Take 1 tablet by mouth 2 (Two) Times a Day. 3/23/17  Yes Pradeep Hwang MD   citalopram (CeleXA) 20 MG tablet Take 40 mg by mouth Daily. 2/22/17  Yes Historical Provider, MD   DIGITEK 250 MCG tablet Take 250 mcg by mouth Daily. 2/23/17  Yes Historical Provider, MD   ELIQUIS 5 MG tablet tablet Take 1 tablet by mouth 2 (Two) Times a Day. 3/23/17  Yes Pradeep Hwang MD   furosemide (LASIX) 40 MG tablet Take 1 tablet by mouth Daily. 3/23/17  Yes Pradeep Hwang MD   lisinopril (PRINIVIL,ZESTRIL) 40 MG tablet Take 1 tablet by mouth Daily. 11/22/17  Yes Pradeep Hwang MD   nicotine  (NICODERM CQ) 21 MG/24HR patch Place 1 patch on the skin Daily. 11/22/17  Yes Pradeep Hwang MD   nitroglycerin (NITROSTAT) 0.4 MG SL tablet Place 1 tablet under the tongue Every 5 (Five) Minutes As Needed for Chest Pain for up to 1 dose. Take no more than 3 doses in 15 minutes. 11/22/17  Yes Pradeep Hwang MD   pantoprazole (PROTONIX) 40 MG EC tablet Take 40 mg by mouth Daily. 3/7/17  Yes Historical Provider, MD   spironolactone (ALDACTONE) 25 MG tablet Take 1 tablet by mouth Daily. 11/22/17  Yes Pradeep Hwang MD   ticagrelor (BRILINTA) 90 MG tablet tablet Take 1 tablet by mouth Every 12 (Twelve) Hours. 11/22/17  Yes Pradeep Hwang MD   traMADol (ULTRAM) 50 MG tablet Take 100 mg by mouth Every 8 (Eight) Hours As Needed for Moderate Pain .   Yes Historical Provider, MD   amoxicillin-clavulanate (AUGMENTIN) 875-125 MG per tablet Take 1 tablet by mouth 2 (Two) Times a Day. 1 tablet twice a day for 7 days. Started taking on 11/15/2017.    Historical Provider, MD   difluprednate (DUREZOL) 0.05 % ophthalmic emulsion Administer 1 drop into the left eye 3 (Three) Times a Day.    Historical Provider, MD   HYDROcodone-acetaminophen (NORCO) 7.5-325 MG per tablet Take 1 tablet by mouth Every 6 (Six) Hours As Needed for Moderate Pain . 11/22/17   Pradeep Hwang MD   hydrOXYzine (VISTARIL) 50 MG capsule Take 50 mg by mouth Daily. 12/15/16   Historical Provider, MD   nicotine polacrilex (NICORETTE) 4 MG gum Chew 1 each Every 1 (One) Hour As Needed for Smoking Cessation. 11/22/17   Pradeep Hwang MD   oxyCODONE-acetaminophen (PERCOCET)  MG per tablet Take  tablets by mouth 4 (Four) Times a Day. 3/20/17   Historical Provider, MD       Current Facility-Administered Medications   Medication Dose Route Frequency Provider Last Rate Last Dose   • aspirin chewable tablet 81 mg  81 mg Oral Daily Rose N Villalpando, APRN       • atorvastatin (LIPITOR) tablet 40 mg  40 mg Oral Nightly Rose Villalpando, APRN       • carvedilol  (COREG) tablet 6.25 mg  6.25 mg Oral BID BOB Stone       • citalopram (CeleXA) tablet 40 mg  40 mg Oral Daily BOB Stone       • difluprednate (DUREZOL) 0.05 % ophthalmic emulsion 1 drop  1 drop Left Eye TID BOB Stone       • digoxin (LANOXIN) tablet 250 mcg  250 mcg Oral Daily BOB Stone       • furosemide (LASIX) tablet 40 mg  40 mg Oral Daily BOB Stone       • hydrOXYzine (VISTARIL) capsule 50 mg  50 mg Oral Daily BOB Stone       • lisinopril (PRINIVIL,ZESTRIL) tablet 40 mg  40 mg Oral Daily BOB Stone       • nicotine (NICODERM CQ) 21 MG/24HR patch 1 patch  1 patch Transdermal Q24H BOB Stone       • nitroglycerin (NITROSTAT) SL tablet 0.4 mg  0.4 mg Sublingual Q5 Min PRN BOB Stone       • pantoprazole (PROTONIX) EC tablet 40 mg  40 mg Oral Daily BOB Stone       • sodium chloride 0.9 % flush 1-10 mL  1-10 mL Intravenous PRN BOB Stone       • spironolactone (ALDACTONE) tablet 25 mg  25 mg Oral Daily BOB Stone       • ticagrelor (BRILINTA) tablet 90 mg  90 mg Oral Q12H BOB Stone       • traMADol (ULTRAM) tablet 100 mg  100 mg Oral Q8H PRN João Macias MD   100 mg at 12/09/17 0338        Allergies:  Morphine and related    Objective     Vital Signs  Temp:  [97.2 °F (36.2 °C)-97.9 °F (36.6 °C)] 97.2 °F (36.2 °C)  Heart Rate:  [60-71] 71  Resp:  [12-18] 18  BP: (134-154)/(67-81) 134/70    Telemetry: SR 61-90, A-paced    Labs  Lab Results (last 72 hours)     Procedure Component Value Units Date/Time    Troponin [[id number]]  (Normal) Collected:  12/09/17 0016    Specimen:  Blood Updated:  12/09/17 0051     Troponin I 0.026 ng/mL     Digoxin Level [[id number]]  (Abnormal) Collected:  12/09/17 0040    Specimen:  Blood Updated:  12/09/17 0113     Digoxin 0.70 (L) ng/mL           Physical Exam:  Physical Exam   Constitutional: He is oriented to person, place, and time. Vital  signs are normal. He appears well-developed and well-nourished. No distress.   HENT:   Head: Normocephalic and atraumatic.   Right Ear: External ear normal.   Left Ear: External ear normal.   Eyes: Conjunctivae are normal. Pupils are equal, round, and reactive to light. Right eye exhibits no discharge. Left eye exhibits no discharge.   Neck: Normal range of motion. Neck supple. No JVD present. Carotid bruit is not present. No thyromegaly present.   Cardiovascular: Normal rate, regular rhythm, normal heart sounds and intact distal pulses.  PMI is not displaced.  Exam reveals no gallop, no friction rub and no decreased pulses.    No murmur heard.  Pulses:       Radial pulses are 2+ on the right side, and 2+ on the left side.        Dorsalis pedis pulses are 2+ on the right side, and 2+ on the left side.        Posterior tibial pulses are 2+ on the right side, and 2+ on the left side.   Pulmonary/Chest: Accessory muscle usage present. No respiratory distress. He has no decreased breath sounds. He has no wheezes. He has no rhonchi. He has rales in the right lower field. He exhibits no tenderness.   Abdominal: Soft. Bowel sounds are normal. He exhibits no distension. There is no tenderness.   Musculoskeletal: Normal range of motion. He exhibits no edema.   Neurological: He is alert and oriented to person, place, and time.   Skin: Skin is warm and dry. No rash noted. He is not diaphoretic. No erythema. No pallor.   Psychiatric: He has a normal mood and affect. His behavior is normal. Judgment and thought content normal.   Vitals reviewed.      Results Review:    I reviewed the patient's new clinical results.    Assessment/Plan     Principal Problem:    Chest pain in adult  Active Problems:    Chronic systolic (congestive) heart failure    Paroxysmal atrial fibrillation    GERD (gastroesophageal reflux disease)    COPD (chronic obstructive pulmonary disease)    Tobacco abuse    Essential hypertension    Dyslipidemia     "Anxiety    History of non-ST elevation myocardial infarction (NSTEMI)    S/P CABG x 3    S/P coronary artery stent placement      Plan    1. Admit to  telemetry for observation.  2. BNP.  3. Daily weights.  4. Monitor intake and output.  5. Cardiac/low sodium diet.   6. Continue home medications as ordered.     Addendum:    7. Pt to be discharged home on previously prescribed medications.  8. Recommend follow up with PCP regarding chronic pain issues.  9. Keep scheduled follow up with Dr. Hwang on 12/21/17.  10. Report any worsening symptoms.     I discussed the patients findings and my recommendations with patient and nursing staff.     Rose Villalpando, APRN  12/09/17  9:27 AM    Time: 30 minutes      I have seen and examined the patient. I have discussed the findings with the patient and the mid-level providers.    Subjective:  Patient relates intermittent sever left chest and left shoulder pain with movement. Had a recent SVG-OM stent for similar pain which did not help. Has known severe c-spine dz and he is requesting \"Dilaudid\". \" I don't think this is my heart but I need to be sure\". Has a known severe ischemic CMO and has an AICD    Objective:    LUNGS: clear  CV: RRR  EXT: no c,c,e. Some mild tenderness in the left shoulder area and limited movement    CARDIAC MARKERS ALL NEG  EKG NOT ACUTE    A/P:   NON-CARDIAC CP  CHRONIC PAIN SYNDROME - will fu with pcp      João Macias MD        "

## 2018-01-18 ENCOUNTER — OFFICE VISIT (OUTPATIENT)
Dept: CARDIOLOGY | Facility: CLINIC | Age: 53
End: 2018-01-18

## 2018-01-18 VITALS
BODY MASS INDEX: 30.8 KG/M2 | HEART RATE: 75 BPM | OXYGEN SATURATION: 97 % | DIASTOLIC BLOOD PRESSURE: 80 MMHG | SYSTOLIC BLOOD PRESSURE: 150 MMHG | WEIGHT: 220 LBS | HEIGHT: 71 IN

## 2018-01-18 DIAGNOSIS — I10 ESSENTIAL HYPERTENSION: ICD-10-CM

## 2018-01-18 DIAGNOSIS — I48.0 PAROXYSMAL ATRIAL FIBRILLATION (HCC): ICD-10-CM

## 2018-01-18 DIAGNOSIS — I25.5 ISCHEMIC CARDIOMYOPATHY: ICD-10-CM

## 2018-01-18 DIAGNOSIS — E78.5 DYSLIPIDEMIA: ICD-10-CM

## 2018-01-18 DIAGNOSIS — Z72.0 TOBACCO ABUSE: ICD-10-CM

## 2018-01-18 DIAGNOSIS — I25.810 CORONARY ARTERY DISEASE INVOLVING CORONARY BYPASS GRAFT OF NATIVE HEART WITHOUT ANGINA PECTORIS: Primary | ICD-10-CM

## 2018-01-18 DIAGNOSIS — J44.9 CHRONIC OBSTRUCTIVE PULMONARY DISEASE, UNSPECIFIED COPD TYPE (HCC): ICD-10-CM

## 2018-01-18 PROBLEM — I25.2 HISTORY OF NON-ST ELEVATION MYOCARDIAL INFARCTION (NSTEMI): Status: RESOLVED | Noted: 2017-11-19 | Resolved: 2018-01-18

## 2018-01-18 PROCEDURE — 99214 OFFICE O/P EST MOD 30 MIN: CPT | Performed by: INTERNAL MEDICINE

## 2018-01-18 PROCEDURE — 93000 ELECTROCARDIOGRAM COMPLETE: CPT | Performed by: INTERNAL MEDICINE

## 2018-01-18 RX ORDER — CARVEDILOL 12.5 MG/1
12.5 TABLET ORAL 2 TIMES DAILY WITH MEALS
Qty: 60 TABLET | Refills: 11 | Status: SHIPPED | OUTPATIENT
Start: 2018-01-18 | End: 2018-01-18 | Stop reason: SDUPTHER

## 2018-01-18 RX ORDER — CARVEDILOL 12.5 MG/1
12.5 TABLET ORAL 2 TIMES DAILY WITH MEALS
Qty: 60 TABLET | Refills: 11 | Status: SHIPPED | OUTPATIENT
Start: 2018-01-18

## 2018-01-18 NOTE — PROGRESS NOTES
Reason for Visit: cardiovascular follow up.    HPI:  Juan A Chavez is a 52 y.o. male is here today for hospital follow-up.  He was admitted in November with Unstable angina and ultimately underwent PCI to his OM2 via SVG.  He was also treated for acute on chronic systolic CHF and COPD.  He is frustrated by the cost of Brilinta but notes that he and his wife have found a way to pay for it.   He has been having worsening pain from his degenerative disk disease.  He continues to smoke but has cut down to about 1 PPD.  He cites the anxiety as the reason he is unable to quit.  He went back to the emergency room a couple days after discharge with chest pain.  Troponins were normal and chest pain was felt to be atypical and noncardiac and was discharged home.  He has not had any further significant chest pain.  He also complains of easy bruising.      Previous Cardiac Testing and Procedures:  - CABG (2013) at [facility].    - Nuclear stress (5/28/14) fixed inferior defect consistent with scar, negative for ischemia, EF 35-40%.   - Echo (7/19/14) mild to moderately enlarged LV, EF 30-35%  - Lipid panel (2/15/16) 95/28/52/123  - BNP (02/14/2016) 2020  - Echo (3/28/2017) EF 25-30%, severe LV dilation, grade I diastolic dysfunction, basal anteroseptal and basal to mid inferoseptal akinesis, mild MR, mild LA dilation, mild MR and TR.  - Nuclear stress (11/20/2017) EF 33%, fixed inferior defect consistent with scar, small size, mild intensity reversible defect in the mid to distal anterior septal region concerning for ischemia  - LHC (11/21/2017) severe 2 vessel native CAD, patent LIMA to D1, patent SVG Y graft to OM 1 and OM 2/LPL with severe stenosis of native OM 2 just distal to the anastomotic site of the vein graft, status post PCI with 2.5 x 15 mm Xience AMAYA  - ProBNP (11/19/2017) 2640  - Lipid panel (11/20/2017) total cholesterol 89, HDL 17, LDL 61, triglycerides 108    Patient Active Problem List   Diagnosis   • Chronic  systolic (congestive) heart failure   • Paroxysmal atrial fibrillation   • GERD (gastroesophageal reflux disease)   • COPD (chronic obstructive pulmonary disease)   • Tobacco abuse   • Essential hypertension   • Dyslipidemia   • Anxiety   • Chronic left shoulder pain   • S/P CABG x 3   • S/P coronary artery stent placement   • Other chronic pain   • Coronary artery disease involving coronary bypass graft of native heart without angina pectoris       Social History   Substance Use Topics   • Smoking status: Current Every Day Smoker     Packs/day: 1.50     Years: 30.00     Types: Cigarettes   • Smokeless tobacco: Never Used   • Alcohol use Yes      Comment: beer 2-3 times a week       Family History   Problem Relation Age of Onset   • Hypertension Other    • Heart disease Other    • Hyperlipidemia Other    • Heart disease Mother    • Stroke Mother    • No Known Problems Father        The following portions of the patient's history were reviewed and updated as appropriate: allergies, current medications, past family history, past medical history, past social history, past surgical history and problem list.      Current Outpatient Prescriptions:   •  atorvastatin (LIPITOR) 40 MG tablet, Take 1 tablet by mouth Every Night., Disp: 30 tablet, Rfl: 11  •  carvedilol (COREG) 12.5 MG tablet, Take 1 tablet by mouth 2 (Two) Times a Day With Meals., Disp: 60 tablet, Rfl: 11  •  citalopram (CeleXA) 20 MG tablet, Take 40 mg by mouth Daily., Disp: , Rfl: 0  •  DIGITEK 250 MCG tablet, Take 250 mcg by mouth Daily., Disp: , Rfl: 0  •  ELIQUIS 5 MG tablet tablet, Take 1 tablet by mouth 2 (Two) Times a Day., Disp: 60 tablet, Rfl: 11  •  furosemide (LASIX) 40 MG tablet, Take 1 tablet by mouth Daily., Disp: 30 tablet, Rfl: 11  •  HYDROcodone-acetaminophen (NORCO) 7.5-325 MG per tablet, Take 1 tablet by mouth Every 6 (Six) Hours As Needed for Moderate Pain ., Disp: 10 tablet, Rfl: 0  •  hydrOXYzine (VISTARIL) 50 MG capsule, Take 50 mg by  "mouth Daily., Disp: , Rfl: 0  •  lisinopril (PRINIVIL,ZESTRIL) 40 MG tablet, Take 1 tablet by mouth Daily., Disp: 30 tablet, Rfl: 11  •  oxyCODONE-acetaminophen (PERCOCET)  MG per tablet, Take  tablets by mouth 4 (Four) Times a Day., Disp: , Rfl: 0  •  pantoprazole (PROTONIX) 40 MG EC tablet, Take 40 mg by mouth Daily., Disp: , Rfl: 0  •  spironolactone (ALDACTONE) 25 MG tablet, Take 1 tablet by mouth Daily., Disp: 30 tablet, Rfl: 11  •  ticagrelor (BRILINTA) 90 MG tablet tablet, Take 1 tablet by mouth Every 12 (Twelve) Hours., Disp: 60 tablet, Rfl: 11  •  traMADol (ULTRAM) 50 MG tablet, Take 100 mg by mouth Every 8 (Eight) Hours As Needed for Moderate Pain ., Disp: , Rfl:     Review of Systems   Constitution: Positive for malaise/fatigue. Negative for chills, decreased appetite and fever.   HENT: Positive for congestion. Negative for nosebleeds.    Eyes: Negative for blurred vision and double vision.   Cardiovascular: Negative for chest pain, irregular heartbeat, leg swelling, orthopnea, palpitations and syncope.   Respiratory: Negative for cough and shortness of breath.    Endocrine: Negative for cold intolerance and heat intolerance.   Hematologic/Lymphatic: Bruises/bleeds easily.   Skin: Positive for dry skin. Negative for itching and rash.   Musculoskeletal: Positive for back pain, joint pain and neck pain. Negative for muscle cramps.   Gastrointestinal: Negative for abdominal pain, constipation, diarrhea, heartburn and melena.   Genitourinary: Negative for dysuria, frequency and hematuria.   Neurological: Negative for dizziness, headaches, light-headedness and loss of balance.   Psychiatric/Behavioral: Negative for depression. The patient has insomnia and is nervous/anxious.        Objective   /80 (BP Location: Left arm, Patient Position: Sitting, Cuff Size: Adult)  Pulse 75  Ht 180.3 cm (71\")  Wt 99.8 kg (220 lb)  SpO2 97%  BMI 30.68 kg/m2  Physical Exam   Constitutional: He is oriented " "to person, place, and time. He appears well-developed and well-nourished.   HENT:   Head: Normocephalic and atraumatic.   Cardiovascular: Normal rate, regular rhythm and normal heart sounds.    No murmur heard.  Pulmonary/Chest: Effort normal and breath sounds normal.   Musculoskeletal: He exhibits no edema.   Neurological: He is alert and oriented to person, place, and time.   Skin: Skin is warm and dry.   Psychiatric: He has a normal mood and affect.       ECG 12 Lead  Date/Time: 1/18/2018 4:05 PM  Performed by: DEDE HENSLEY  Authorized by: DEDE HENSLEY   Comparison: compared with previous ECG from 12/9/2017  Similar to previous ECG  Rhythm: sinus rhythm  Rate: normal  Other findings: LVH with strain and LAE              ICD-10-CM ICD-9-CM   1. Coronary artery disease involving coronary bypass graft of native heart without angina pectoris I25.810 414.05   2. Ischemic cardiomyopathy I25.5 414.8   3. Tobacco abuse Z72.0 305.1   4. Essential hypertension I10 401.9   5. Dyslipidemia E78.5 272.4   6. Paroxysmal atrial fibrillation I48.0 427.31   7. Chronic obstructive pulmonary disease, unspecified COPD type J44.9 496         Assessment/Plan:  1.  CAD: S/p PCI to OM2 via SVG.  Currently chest pain-free.  Has completed 1 month of \"triple therapy\" and has significant bruising.  Will discontinue aspirin and continue Brilinta, Eliquis, and atorvastatin.  Titrate up carvedilol for low pressure control.    2.  Ischemic cardiomyopathy: AICD in place.  Currently appears euvolemic.  Titrate up carvedilol to improve blood pressure and heart rate control.  Continue lisinopril, furosemide, and spironolactone.    3. Tobacco abuse:  Has cut back to 1 PPD.   on the importance of complete cessation.      4.  Hypertension: Blood pressure is elevated today.  Will increase carvedilol to 12.5 mg.    5.  Dyslipidemia: Well-controlled on atorvastatin.    6.  Paroxysmal atrial fibrillation: Remains in sinus rhythm on EKG today.  " Continue therapy with carvedilol and anticoagulation with Eliquis     7.  COPD: Patient had wheezing and evidence of COPD during admission but has never had PFT's done.  Recommend he discuss this with PCP.

## 2018-01-29 ENCOUNTER — TRANSCRIBE ORDERS (OUTPATIENT)
Dept: ADMINISTRATIVE | Facility: HOSPITAL | Age: 53
End: 2018-01-29

## 2018-01-29 DIAGNOSIS — M54.2 NECK PAIN: Primary | ICD-10-CM

## 2018-01-29 DIAGNOSIS — M54.12 CERVICAL RADICULOPATHY: ICD-10-CM

## 2018-06-28 ENCOUNTER — TELEPHONE (OUTPATIENT)
Dept: CARDIOLOGY | Facility: CLINIC | Age: 53
End: 2018-06-28

## 2018-06-28 NOTE — TELEPHONE ENCOUNTER
I recommend he check with his insurance to see if they cover any the alternatives such as Pradaxa or Xarelto.  If not warfarin the other alternative which is less expensive but requires regular blood monitoring.

## 2018-06-28 NOTE — TELEPHONE ENCOUNTER
Pt called and said that he stopped taking his Eliquis 10 days ago bc he can't afford it.  He is still taking the Brillinta and said that he seems to be doing fine, but he wanted you to know so that if he needs something to replace Eliquis you can let him know.

## 2018-06-28 NOTE — TELEPHONE ENCOUNTER
He said that all of the related medications have a copay also, and he is just in a hardship to where he can't pay for it.  He said that his other doctors questioned him as to why he was on Eliquis and Brillinta both, and he didn't understand why he has to take 2 different blood thinners.

## 2018-06-28 NOTE — TELEPHONE ENCOUNTER
He is on the Brilinta for his coronary artery disease and recent stent.  He is primarily on the Eliquis for prevention of clots due to atrial fibrillation.  Please let me know if he is open to trying warfarin and I will send in a prescription for this and we can get him started on anticoagulation monitoring.

## 2018-06-29 NOTE — TELEPHONE ENCOUNTER
Pt called and stated that he was gonna continue eliquis and that his money issues were getting better also informed him that we could try pt assistance he stated that he would see about doing that but wanted to continue the eliquis for now

## 2019-01-10 ENCOUNTER — OFFICE VISIT (OUTPATIENT)
Dept: CARDIOLOGY | Facility: CLINIC | Age: 54
End: 2019-01-10

## 2019-01-10 VITALS
DIASTOLIC BLOOD PRESSURE: 90 MMHG | OXYGEN SATURATION: 96 % | HEIGHT: 71 IN | WEIGHT: 194 LBS | SYSTOLIC BLOOD PRESSURE: 168 MMHG | HEART RATE: 66 BPM | BODY MASS INDEX: 27.16 KG/M2

## 2019-01-10 DIAGNOSIS — I10 ESSENTIAL HYPERTENSION: ICD-10-CM

## 2019-01-10 DIAGNOSIS — Z01.818 PREOPERATIVE EVALUATION TO RULE OUT SURGICAL CONTRAINDICATION: ICD-10-CM

## 2019-01-10 DIAGNOSIS — I25.810 CORONARY ARTERY DISEASE INVOLVING CORONARY BYPASS GRAFT OF NATIVE HEART WITHOUT ANGINA PECTORIS: Primary | ICD-10-CM

## 2019-01-10 DIAGNOSIS — E78.5 DYSLIPIDEMIA: ICD-10-CM

## 2019-01-10 DIAGNOSIS — I48.0 PAROXYSMAL ATRIAL FIBRILLATION (HCC): ICD-10-CM

## 2019-01-10 DIAGNOSIS — Z95.810 AICD (AUTOMATIC CARDIOVERTER/DEFIBRILLATOR) PRESENT: ICD-10-CM

## 2019-01-10 DIAGNOSIS — Z72.0 TOBACCO ABUSE: ICD-10-CM

## 2019-01-10 DIAGNOSIS — I50.22 CHRONIC SYSTOLIC CONGESTIVE HEART FAILURE (HCC): ICD-10-CM

## 2019-01-10 PROCEDURE — 99406 BEHAV CHNG SMOKING 3-10 MIN: CPT | Performed by: INTERNAL MEDICINE

## 2019-01-10 PROCEDURE — 99214 OFFICE O/P EST MOD 30 MIN: CPT | Performed by: INTERNAL MEDICINE

## 2019-01-10 RX ORDER — CLONIDINE HYDROCHLORIDE 0.1 MG/1
0.1 TABLET ORAL 2 TIMES DAILY
COMMUNITY

## 2019-01-10 RX ORDER — BUPRENORPHINE AND NALOXONE 12; 3 MG/1; MG/1
FILM, SOLUBLE BUCCAL; SUBLINGUAL DAILY
COMMUNITY
End: 2019-05-20

## 2019-01-10 RX ORDER — SPIRONOLACTONE 25 MG/1
25 TABLET ORAL DAILY
Qty: 30 TABLET | Refills: 11 | Status: SHIPPED | OUTPATIENT
Start: 2019-01-10 | End: 2019-01-10 | Stop reason: SDUPTHER

## 2019-01-10 RX ORDER — SPIRONOLACTONE 25 MG/1
25 TABLET ORAL DAILY
Qty: 30 TABLET | Refills: 11 | Status: SHIPPED | OUTPATIENT
Start: 2019-01-10 | End: 2019-05-20

## 2019-01-10 NOTE — PROGRESS NOTES
Reason for Visit: cardiovascular follow up.    HPI:  Juan A Chavez is a 53 y.o. male is here today for follow-up.  He was last seen 1 year ago.  He has either canceled her no show to multiple different appointments.  He has not had any recent cardiac issues.  He denies any chest pain, palpitations, dizziness, syncope, PND, or orthopnea.  His blood pressure remains uncontrolled both at home and here in the office.  He reports being compliant with his medications.  He continues to smoke heavily.  Last visit he was smoking one pack per day now he is smoking 1-1/2 packs per day.  He reports a lot of stress at home which makes it difficult for him to quit smoking.  He is not able to exercise much because of his neck.  He is planning on having surgery in the near future.  He has not had his AICD checked in some time.    Previous Cardiac Testing and Procedures:  - CABG (2013) at [facility].    - Nuclear stress (5/28/14) fixed inferior defect consistent with scar, negative for ischemia, EF 35-40%.   - Echo (7/19/14) mild to moderately enlarged LV, EF 30-35%  - Lipid panel (2/15/16) 95/28/52/123  - BNP (02/14/2016) 2020  - Echo (3/28/2017) EF 25-30%, severe LV dilation, grade I diastolic dysfunction, basal anteroseptal and basal to mid inferoseptal akinesis, mild MR, mild LA dilation, mild MR and TR.  - Nuclear stress (11/20/2017) EF 33%, fixed inferior defect consistent with scar, small size, mild intensity reversible defect in the mid to distal anterior septal region concerning for ischemia  - LHC (11/21/2017) severe 2 vessel native CAD, patent LIMA to D1, patent SVG Y graft to OM 1 and OM 2/LPL with severe stenosis of native OM 2 just distal to the anastomotic site of the vein graft, status post PCI with 2.5 x 15 mm Xience AMAYA  - ProBNP (11/19/2017) 2640  - Lipid panel (11/20/2017) total cholesterol 89, HDL 17, LDL 61, triglycerides 108      Patient Active Problem List   Diagnosis   • Chronic systolic (congestive) heart  failure   • Paroxysmal atrial fibrillation (CMS/HCC)   • GERD (gastroesophageal reflux disease)   • COPD (chronic obstructive pulmonary disease) (CMS/HCC)   • Tobacco abuse   • Essential hypertension   • Dyslipidemia   • Anxiety   • Chronic left shoulder pain   • S/P CABG x 3   • S/P coronary artery stent placement   • Other chronic pain   • Coronary artery disease involving coronary bypass graft of native heart without angina pectoris   • AICD (automatic cardioverter/defibrillator) present       Social History     Tobacco Use   • Smoking status: Current Every Day Smoker     Packs/day: 1.50     Years: 30.00     Pack years: 45.00     Types: Cigarettes   • Smokeless tobacco: Never Used   Substance Use Topics   • Alcohol use: Yes     Comment: beer 2-3 times a week   • Drug use: Yes     Types: Marijuana     Comment: hasnt in a few days       Family History   Problem Relation Age of Onset   • Hypertension Other    • Heart disease Other    • Hyperlipidemia Other    • Heart disease Mother    • Stroke Mother    • No Known Problems Father        The following portions of the patient's history were reviewed and updated as appropriate: allergies, current medications, past family history, past medical history, past social history, past surgical history and problem list.      Current Outpatient Medications:   •  atorvastatin (LIPITOR) 40 MG tablet, Take 1 tablet by mouth Every Night., Disp: 30 tablet, Rfl: 11  •  buprenorphine-naloxone (SUBOXONE) 12-3 MG film sublingual film, Place  under the tongue Daily., Disp: , Rfl:   •  carvedilol (COREG) 12.5 MG tablet, Take 1 tablet by mouth 2 (Two) Times a Day With Meals., Disp: 60 tablet, Rfl: 11  •  citalopram (CeleXA) 20 MG tablet, Take 10 mg by mouth Daily. TAKING 30 MG, Disp: , Rfl: 0  •  CloNIDine (CATAPRES) 0.1 MG tablet, Take 0.1 mg by mouth 2 (Two) Times a Day., Disp: , Rfl:   •  DIGITEK 250 MCG tablet, Take 250 mcg by mouth Daily., Disp: , Rfl: 0  •  ELIQUIS 5 MG tablet tablet,  "Take 1 tablet by mouth 2 (Two) Times a Day., Disp: 60 tablet, Rfl: 11  •  furosemide (LASIX) 40 MG tablet, Take 1 tablet by mouth Daily., Disp: 30 tablet, Rfl: 11  •  lisinopril (PRINIVIL,ZESTRIL) 40 MG tablet, Take 1 tablet by mouth Daily. (Patient taking differently: Take 20 mg by mouth Daily.), Disp: 30 tablet, Rfl: 11  •  pantoprazole (PROTONIX) 40 MG EC tablet, Take 40 mg by mouth Daily., Disp: , Rfl: 0  •  aspirin 81 MG tablet, Take 1 tablet by mouth Daily., Disp: 30 tablet, Rfl: 11  •  spironolactone (ALDACTONE) 25 MG tablet, Take 1 tablet by mouth Daily., Disp: 30 tablet, Rfl: 11  •  traMADol (ULTRAM) 50 MG tablet, Take 100 mg by mouth Every 8 (Eight) Hours As Needed for Moderate Pain ., Disp: , Rfl:     Review of Systems   Constitution: Negative for chills and fever.   Cardiovascular: Negative for chest pain and paroxysmal nocturnal dyspnea.   Respiratory: Negative for cough and shortness of breath.    Skin: Negative for rash.   Musculoskeletal: Positive for joint pain and neck pain.   Gastrointestinal: Negative for abdominal pain and heartburn.   Neurological: Negative for dizziness and numbness.       Objective   /90 (BP Location: Left arm, Patient Position: Sitting, Cuff Size: Adult)   Pulse 66   Ht 180.3 cm (70.98\")   Wt 88 kg (194 lb)   SpO2 96%   BMI 27.07 kg/m²   Physical Exam   Constitutional: He is oriented to person, place, and time. He appears well-developed and well-nourished.   HENT:   Head: Normocephalic and atraumatic.   Cardiovascular: Normal rate, regular rhythm and normal heart sounds.   No murmur heard.  Pulmonary/Chest: Effort normal and breath sounds normal.   Musculoskeletal: He exhibits no edema.   Neurological: He is alert and oriented to person, place, and time.   Skin: Skin is warm and dry.   Psychiatric: He has a normal mood and affect.     Procedures      ICD-10-CM ICD-9-CM   1. Coronary artery disease involving coronary bypass graft of native heart without angina " pectoris I25.810 414.05   2. Chronic systolic (congestive) heart failure I50.22 428.22     428.0   3. Tobacco abuse Z72.0 305.1   4. Essential hypertension I10 401.9   5. Dyslipidemia E78.5 272.4   6. Paroxysmal atrial fibrillation (CMS/HCC) I48.0 427.31   7. AICD (automatic cardioverter/defibrillator) present Z95.810 V45.02   8. Preoperative evaluation to rule out surgical contraindication Z01.818 V72.83         Assessment/Plan:  1. CAD: S/p PCI to OM2 via SVG 11/2017.  Remains chest pain-free.   will discontinue Brilinta and restart aspirin.  Continue atorvastatin and carvedilol.       2.   tonic systolic CHF: Ischemic cardiomyopathy with: AICD in place.   Remains euvolemic and stable.  Restart spironolactone.  Continue lisinopril and carvedilol.      3. Tobacco abuse:  Continues to smoke heavily about one half packs per day.  I advised Juan A of the risks of continuing to use tobacco, and I provided him with tobacco cessation educational materials in the After Visit Summary.  During this visit, I spent 7 minutes counseling the patient regarding tobacco cessation.     4.  Hypertension: Blood pressure remains uncontrolled.  Will restart spironolactone.  Check BMP in 2 weeks.     5.  Dyslipidemia: Well-controlled on atorvastatin based on last lipid panel from November 2017.     6.  Paroxysmal atrial fibrillation:  Regular rhythm on exam.  Continue carvedilol and anticoagulation with Eliquis      7.  AICD: No recent interrogations.  Will set up in pacemaker clinic.    8.  Preoperative evaluation: Patient is an intermediate risk surgical From a cardiac standpoint.  He is currently euvolemic and stable.  He had recent revascularization in November 2017 and has remained asymptomatic since that time.  No further cardiac testing is indicated at this time.  It is acceptable for him to hold Eliquis for the procedure for 48-72 hours and resume when safe afterwards.  He should continue aspirin and all other cardiac medications  throughout the perioperative period.

## 2019-01-24 ENCOUNTER — RESULTS ENCOUNTER (OUTPATIENT)
Dept: CARDIOLOGY | Facility: CLINIC | Age: 54
End: 2019-01-24

## 2019-01-24 DIAGNOSIS — I50.22 CHRONIC SYSTOLIC CONGESTIVE HEART FAILURE (HCC): ICD-10-CM

## 2019-03-11 ENCOUNTER — TELEPHONE (OUTPATIENT)
Dept: CARDIOLOGY | Facility: CLINIC | Age: 54
End: 2019-03-11

## 2019-03-11 ENCOUNTER — CLINICAL SUPPORT (OUTPATIENT)
Dept: CARDIOLOGY | Facility: CLINIC | Age: 54
End: 2019-03-11

## 2019-03-11 DIAGNOSIS — Z95.810 AICD (AUTOMATIC CARDIOVERTER/DEFIBRILLATOR) PRESENT: ICD-10-CM

## 2019-03-11 PROCEDURE — 93289 INTERROG DEVICE EVAL HEART: CPT | Performed by: NURSE PRACTITIONER

## 2019-03-11 NOTE — PROGRESS NOTES
[employer] AICD Evaluation Report    March 11, 2019    Indication for AICD: Primary Prevention of Sudden Cardiac Death    Implanting MD: Dr. Blackmon    : [employer] Model: Ellipse DR 2411-36Q    Implant Date: 4/18/2014    Reason For Evaluation: overdue      DIAGNOSTIC DATA    Atrial paced: 23%  Ventricular paced: less than 1 %    Battery status: satisfactory    Estimated battery life: 4 years      FINAL PARAMETERS    Changes made: none    Conclusions: normal AICD function, stable pacing and sensing thresholds, adequate battery reserve and no shocks or ATP delivered     Patient asking about being able to have an MRI due to head injury. Will check with rep to see if leads are compatible. If compatible, will discuss with neurologist and Dr. Hwang to see if they think medically necessary to change to MRI compatible AICD generator.     Message sent to Nu to get him on Merlin.      Return in about 3 months (around 6/11/2019) for Pacemaker Clinic ([employer]).      Rose Wolf, APRN, ACNP-BC, CHFN-BC

## 2019-03-11 NOTE — TELEPHONE ENCOUNTER
Called but no answer and no voicemail. Please notify him that I reviewed his device and leads with the [employer] rep but he does not have a MRI compatible leads. However, whoever wants him to have MRI can order and schedule it to be done in [place] at [place] or [place] and they may be able to do his MRI with shielding and programming of his existing AICD. He will not be able to have MRI done locally. Let me know if further questions. TX!

## 2019-03-12 NOTE — TELEPHONE ENCOUNTER
Tried calling patient to inform him I requested transfer and would be ordering him a new monitor. I need to know if patient needs a cellular adapter for his Merlin monitor. No answer, no VM.

## 2019-03-22 NOTE — TELEPHONE ENCOUNTER
I still have not got in touch with the patient. I finally got him transferred into our Merlin system and ordered him a new monitor today 3/22/19. [facility] Rep Gonzalez will mail him a cellular adapter to the address on file.

## 2019-04-19 ENCOUNTER — TRANSCRIBE ORDERS (OUTPATIENT)
Dept: ADMINISTRATIVE | Facility: HOSPITAL | Age: 54
End: 2019-04-19

## 2019-04-19 DIAGNOSIS — M54.2 CERVICAL PAIN: Primary | ICD-10-CM

## 2019-04-26 ENCOUNTER — HOSPITAL ENCOUNTER (OUTPATIENT)
Dept: CT IMAGING | Facility: HOSPITAL | Age: 54
Discharge: HOME | End: 2019-04-26

## 2019-04-26 ENCOUNTER — HOSPITAL ENCOUNTER (OUTPATIENT)
Dept: GENERAL RADIOLOGY | Facility: HOSPITAL | Age: 54
Discharge: HOME | End: 2019-04-26
Admitting: PHYSICIAN ASSISTANT

## 2019-04-26 VITALS
HEART RATE: 63 BPM | BODY MASS INDEX: 27.35 KG/M2 | SYSTOLIC BLOOD PRESSURE: 169 MMHG | HEIGHT: 71 IN | DIASTOLIC BLOOD PRESSURE: 71 MMHG | OXYGEN SATURATION: 97 % | WEIGHT: 195.38 LBS | RESPIRATION RATE: 14 BRPM | TEMPERATURE: 98.7 F

## 2019-04-26 DIAGNOSIS — M54.2 CERVICAL PAIN: ICD-10-CM

## 2019-04-26 LAB
APTT PPP: 35.4 SECONDS (ref 24.1–35)
INR PPP: 1.05 (ref 0.91–1.09)
PLATELET # BLD AUTO: 191 10*3/MM3 (ref 130–400)
PROTHROMBIN TIME: 14 SECONDS (ref 11.9–14.6)

## 2019-04-26 PROCEDURE — 62284 INJECTION FOR MYELOGRAM: CPT

## 2019-04-26 PROCEDURE — 85730 THROMBOPLASTIN TIME PARTIAL: CPT | Performed by: RADIOLOGY

## 2019-04-26 PROCEDURE — 36415 COLL VENOUS BLD VENIPUNCTURE: CPT

## 2019-04-26 PROCEDURE — 85610 PROTHROMBIN TIME: CPT | Performed by: RADIOLOGY

## 2019-04-26 PROCEDURE — 85049 AUTOMATED PLATELET COUNT: CPT | Performed by: RADIOLOGY

## 2019-04-26 PROCEDURE — 72126 CT NECK SPINE W/DYE: CPT

## 2019-04-26 PROCEDURE — 25010000002 IOPAMIDOL 61 % SOLUTION: Performed by: PHYSICIAN ASSISTANT

## 2019-04-26 PROCEDURE — 72240 MYELOGRAPHY NECK SPINE: CPT

## 2019-04-26 PROCEDURE — 62302 MYELOGRAPHY LUMBAR INJECTION: CPT

## 2019-04-26 RX ORDER — LIDOCAINE HYDROCHLORIDE 10 MG/ML
5 INJECTION, SOLUTION EPIDURAL; INFILTRATION; INTRACAUDAL; PERINEURAL ONCE
Status: COMPLETED | OUTPATIENT
Start: 2019-04-26 | End: 2019-04-26

## 2019-04-26 RX ORDER — OXYCODONE AND ACETAMINOPHEN 10; 325 MG/1; MG/1
1 TABLET ORAL 4 TIMES DAILY PRN
COMMUNITY
End: 2019-05-23 | Stop reason: HOSPADM

## 2019-04-26 RX ADMIN — IOPAMIDOL 15 ML: 612 INJECTION, SOLUTION INTRATHECAL at 12:15

## 2019-04-26 RX ADMIN — LIDOCAINE HYDROCHLORIDE 5 ML: 10 INJECTION, SOLUTION EPIDURAL; INFILTRATION; INTRACAUDAL; PERINEURAL at 12:12

## 2019-05-17 ENCOUNTER — TELEPHONE (OUTPATIENT)
Dept: CARDIOLOGY | Facility: CLINIC | Age: 54
End: 2019-05-17

## 2019-05-17 ENCOUNTER — APPOINTMENT (OUTPATIENT)
Dept: PREADMISSION TESTING | Facility: HOSPITAL | Age: 54
End: 2019-05-17

## 2019-05-17 NOTE — TELEPHONE ENCOUNTER
----- Message from Pradeep Hwang MD sent at 5/15/2019  5:13 PM CDT -----  Yes, preoperative evaluation and medication instructions from the last office visit in January still applied.  He can forward a copy of the note to them.     ----- Message -----  From: Arelis Tovar MA  Sent: 5/15/2019   3:33 PM  To: Pradeep Hwang MD    5/15/19 [facility] surgical clearance request.  Will medication instructions stay the same as January office visit?

## 2019-05-20 ENCOUNTER — APPOINTMENT (OUTPATIENT)
Dept: PREADMISSION TESTING | Facility: HOSPITAL | Age: 54
End: 2019-05-20

## 2019-05-20 ENCOUNTER — HOSPITAL ENCOUNTER (OUTPATIENT)
Dept: GENERAL RADIOLOGY | Facility: HOSPITAL | Age: 54
Discharge: HOME | End: 2019-05-20
Admitting: ORTHOPAEDIC SURGERY

## 2019-05-20 VITALS
HEART RATE: 64 BPM | RESPIRATION RATE: 18 BRPM | DIASTOLIC BLOOD PRESSURE: 51 MMHG | OXYGEN SATURATION: 97 % | SYSTOLIC BLOOD PRESSURE: 139 MMHG | WEIGHT: 190.48 LBS | BODY MASS INDEX: 27.27 KG/M2 | HEIGHT: 70 IN

## 2019-05-20 LAB
ALBUMIN SERPL-MCNC: 4.1 G/DL (ref 3.5–5)
ALBUMIN/GLOB SERPL: 1.2 G/DL (ref 1.1–2.5)
ALP SERPL-CCNC: 111 U/L (ref 24–120)
ALT SERPL W P-5'-P-CCNC: 29 U/L (ref 0–54)
ANION GAP SERPL CALCULATED.3IONS-SCNC: 4 MMOL/L (ref 4–13)
APTT PPP: 34.8 SECONDS (ref 24.1–35)
AST SERPL-CCNC: 43 U/L (ref 7–45)
BACTERIA UR QL AUTO: ABNORMAL /HPF
BASOPHILS # BLD AUTO: 0.06 10*3/MM3 (ref 0–0.2)
BASOPHILS NFR BLD AUTO: 0.9 % (ref 0–2)
BILIRUB SERPL-MCNC: 0.4 MG/DL (ref 0.1–1)
BILIRUB UR QL STRIP: NEGATIVE
BUN BLD-MCNC: 12 MG/DL (ref 5–21)
BUN/CREAT SERPL: 18.8 (ref 7–25)
CALCIUM SPEC-SCNC: 9.3 MG/DL (ref 8.4–10.4)
CHLORIDE SERPL-SCNC: 102 MMOL/L (ref 98–110)
CLARITY UR: CLEAR
CO2 SERPL-SCNC: 34 MMOL/L (ref 24–31)
COLOR UR: YELLOW
CREAT BLD-MCNC: 0.64 MG/DL (ref 0.5–1.4)
DEPRECATED RDW RBC AUTO: 47.4 FL (ref 40–54)
EOSINOPHIL # BLD AUTO: 0.43 10*3/MM3 (ref 0–0.7)
EOSINOPHIL NFR BLD AUTO: 6.2 % (ref 0–4)
ERYTHROCYTE [DISTWIDTH] IN BLOOD BY AUTOMATED COUNT: 13.4 % (ref 12–15)
GFR SERPL CREATININE-BSD FRML MDRD: 129 ML/MIN/1.73
GLOBULIN UR ELPH-MCNC: 3.3 GM/DL
GLUCOSE BLD-MCNC: 89 MG/DL (ref 70–100)
GLUCOSE UR STRIP-MCNC: NEGATIVE MG/DL
HCT VFR BLD AUTO: 39.7 % (ref 40–52)
HGB BLD-MCNC: 13.2 G/DL (ref 14–18)
HGB UR QL STRIP.AUTO: NEGATIVE
HYALINE CASTS UR QL AUTO: ABNORMAL /LPF
IMM GRANULOCYTES # BLD AUTO: 0.01 10*3/MM3 (ref 0–0.05)
IMM GRANULOCYTES NFR BLD AUTO: 0.1 % (ref 0–5)
INR PPP: 0.97 (ref 0.91–1.09)
KETONES UR QL STRIP: NEGATIVE
LEUKOCYTE ESTERASE UR QL STRIP.AUTO: ABNORMAL
LYMPHOCYTES # BLD AUTO: 1.78 10*3/MM3 (ref 0.72–4.86)
LYMPHOCYTES NFR BLD AUTO: 25.8 % (ref 15–45)
MCH RBC QN AUTO: 31.8 PG (ref 28–32)
MCHC RBC AUTO-ENTMCNC: 33.2 G/DL (ref 33–36)
MCV RBC AUTO: 95.7 FL (ref 82–95)
MONOCYTES # BLD AUTO: 1.1 10*3/MM3 (ref 0.19–1.3)
MONOCYTES NFR BLD AUTO: 15.9 % (ref 4–12)
NEUTROPHILS # BLD AUTO: 3.53 10*3/MM3 (ref 1.87–8.4)
NEUTROPHILS NFR BLD AUTO: 51.1 % (ref 39–78)
NITRITE UR QL STRIP: NEGATIVE
NRBC BLD AUTO-RTO: 0 /100 WBC (ref 0–0.2)
PH UR STRIP.AUTO: 5.5 [PH] (ref 5–8)
PLATELET # BLD AUTO: 140 10*3/MM3 (ref 130–400)
PMV BLD AUTO: 11.9 FL (ref 6–12)
POTASSIUM BLD-SCNC: 4.5 MMOL/L (ref 3.5–5.3)
PROT SERPL-MCNC: 7.4 G/DL (ref 6.3–8.7)
PROT UR QL STRIP: NEGATIVE
PROTHROMBIN TIME: 13.2 SECONDS (ref 11.9–14.6)
RBC # BLD AUTO: 4.15 10*6/MM3 (ref 4.8–5.9)
RBC # UR: ABNORMAL /HPF
REF LAB TEST METHOD: ABNORMAL
SODIUM BLD-SCNC: 140 MMOL/L (ref 135–145)
SP GR UR STRIP: 1.01 (ref 1–1.03)
SQUAMOUS #/AREA URNS HPF: ABNORMAL /HPF
UROBILINOGEN UR QL STRIP: ABNORMAL
WBC NRBC COR # BLD: 6.91 10*3/MM3 (ref 4.8–10.8)
WBC UR QL AUTO: ABNORMAL /HPF

## 2019-05-20 PROCEDURE — 36415 COLL VENOUS BLD VENIPUNCTURE: CPT

## 2019-05-20 PROCEDURE — 85730 THROMBOPLASTIN TIME PARTIAL: CPT | Performed by: ORTHOPAEDIC SURGERY

## 2019-05-20 PROCEDURE — 93005 ELECTROCARDIOGRAM TRACING: CPT

## 2019-05-20 PROCEDURE — 93010 ELECTROCARDIOGRAM REPORT: CPT | Performed by: INTERNAL MEDICINE

## 2019-05-20 PROCEDURE — 85610 PROTHROMBIN TIME: CPT | Performed by: ORTHOPAEDIC SURGERY

## 2019-05-20 PROCEDURE — 85025 COMPLETE CBC W/AUTO DIFF WBC: CPT | Performed by: ORTHOPAEDIC SURGERY

## 2019-05-20 PROCEDURE — 80053 COMPREHEN METABOLIC PANEL: CPT | Performed by: ORTHOPAEDIC SURGERY

## 2019-05-20 PROCEDURE — 81001 URINALYSIS AUTO W/SCOPE: CPT | Performed by: ORTHOPAEDIC SURGERY

## 2019-05-20 PROCEDURE — 71046 X-RAY EXAM CHEST 2 VIEWS: CPT

## 2019-05-22 ENCOUNTER — DOCUMENTATION (OUTPATIENT)
Dept: CARDIOLOGY | Facility: CLINIC | Age: 54
End: 2019-05-22

## 2019-05-22 ENCOUNTER — APPOINTMENT (OUTPATIENT)
Dept: GENERAL RADIOLOGY | Facility: HOSPITAL | Age: 54
End: 2019-05-22

## 2019-05-22 ENCOUNTER — HOSPITAL ENCOUNTER (INPATIENT)
Facility: HOSPITAL | Age: 54
LOS: 1 days | Discharge: HOME | End: 2019-05-23
Attending: ORTHOPAEDIC SURGERY | Admitting: ORTHOPAEDIC SURGERY

## 2019-05-22 ENCOUNTER — ANESTHESIA EVENT (OUTPATIENT)
Dept: PERIOP | Facility: HOSPITAL | Age: 54
End: 2019-05-22

## 2019-05-22 ENCOUNTER — ANESTHESIA (OUTPATIENT)
Dept: PERIOP | Facility: HOSPITAL | Age: 54
End: 2019-05-22

## 2019-05-22 DIAGNOSIS — Z74.09 IMPAIRED MOBILITY: ICD-10-CM

## 2019-05-22 DIAGNOSIS — Z95.810 AICD (AUTOMATIC CARDIOVERTER/DEFIBRILLATOR) PRESENT: Primary | ICD-10-CM

## 2019-05-22 PROBLEM — M50.30 DDD (DEGENERATIVE DISC DISEASE), CERVICAL: Status: ACTIVE | Noted: 2019-05-22

## 2019-05-22 LAB
ABO GROUP BLD: NORMAL
BLD GP AB SCN SERPL QL: NEGATIVE
RH BLD: POSITIVE
T&S EXPIRATION DATE: NORMAL

## 2019-05-22 PROCEDURE — 0PP304Z REMOVAL OF INTERNAL FIXATION DEVICE FROM CERVICAL VERTEBRA, OPEN APPROACH: ICD-10-PCS | Performed by: ORTHOPAEDIC SURGERY

## 2019-05-22 PROCEDURE — 25010000003 CEFAZOLIN 1-4 GM/50ML-% SOLUTION: Performed by: ORTHOPAEDIC SURGERY

## 2019-05-22 PROCEDURE — 97161 PT EVAL LOW COMPLEX 20 MIN: CPT | Performed by: PHYSICAL THERAPIST

## 2019-05-22 PROCEDURE — 97165 OT EVAL LOW COMPLEX 30 MIN: CPT

## 2019-05-22 PROCEDURE — 94799 UNLISTED PULMONARY SVC/PX: CPT

## 2019-05-22 PROCEDURE — 25010000002 ONDANSETRON PER 1 MG: Performed by: NURSE ANESTHETIST, CERTIFIED REGISTERED

## 2019-05-22 PROCEDURE — 25010000002 SUCCINYLCHOLINE PER 20 MG: Performed by: NURSE ANESTHETIST, CERTIFIED REGISTERED

## 2019-05-22 PROCEDURE — C1713 ANCHOR/SCREW BN/BN,TIS/BN: HCPCS | Performed by: ORTHOPAEDIC SURGERY

## 2019-05-22 PROCEDURE — 86900 BLOOD TYPING SEROLOGIC ABO: CPT | Performed by: ORTHOPAEDIC SURGERY

## 2019-05-22 PROCEDURE — 76000 FLUOROSCOPY <1 HR PHYS/QHP: CPT

## 2019-05-22 PROCEDURE — 0RB30ZZ EXCISION OF CERVICAL VERTEBRAL DISC, OPEN APPROACH: ICD-10-PCS | Performed by: ORTHOPAEDIC SURGERY

## 2019-05-22 PROCEDURE — 0RG1070 FUSION OF CERVICAL VERTEBRAL JOINT WITH AUTOLOGOUS TISSUE SUBSTITUTE, ANTERIOR APPROACH, ANTERIOR COLUMN, OPEN APPROACH: ICD-10-PCS | Performed by: ORTHOPAEDIC SURGERY

## 2019-05-22 PROCEDURE — 25010000002 MIDAZOLAM PER 1 MG: Performed by: NURSE ANESTHETIST, CERTIFIED REGISTERED

## 2019-05-22 PROCEDURE — 86901 BLOOD TYPING SEROLOGIC RH(D): CPT | Performed by: ORTHOPAEDIC SURGERY

## 2019-05-22 PROCEDURE — 0RG10A0 FUSION OF CERVICAL VERTEBRAL JOINT WITH INTERBODY FUSION DEVICE, ANTERIOR APPROACH, ANTERIOR COLUMN, OPEN APPROACH: ICD-10-PCS | Performed by: ORTHOPAEDIC SURGERY

## 2019-05-22 PROCEDURE — 25010000002 PROPOFOL 10 MG/ML EMULSION: Performed by: NURSE ANESTHETIST, CERTIFIED REGISTERED

## 2019-05-22 PROCEDURE — 25010000002 DEXAMETHASONE PER 1 MG: Performed by: NURSE ANESTHETIST, CERTIFIED REGISTERED

## 2019-05-22 PROCEDURE — 72040 X-RAY EXAM NECK SPINE 2-3 VW: CPT

## 2019-05-22 PROCEDURE — 86850 RBC ANTIBODY SCREEN: CPT | Performed by: ORTHOPAEDIC SURGERY

## 2019-05-22 PROCEDURE — 03HY32Z INSERTION OF MONITORING DEVICE INTO UPPER ARTERY, PERCUTANEOUS APPROACH: ICD-10-PCS | Performed by: ANESTHESIOLOGY

## 2019-05-22 DEVICE — MATRX BONE VIBONE 1CC: Type: IMPLANTABLE DEVICE | Status: FUNCTIONAL

## 2019-05-22 DEVICE — ANTERIOR CERVICAL PLATE ASSEMBLY, 2-LEVEL, 034MM
Type: IMPLANTABLE DEVICE | Status: FUNCTIONAL
Brand: TRESTLE LUXE

## 2019-05-22 DEVICE — INTERBODY FUSION DEVICE
Type: IMPLANTABLE DEVICE | Status: FUNCTIONAL
Brand: FORTILINK-C IBF SYSTEM

## 2019-05-22 DEVICE — 4.0MM VARIABLE ANGLE, SELF-DRILLING HEXALOBE SCREW, 14MM
Type: IMPLANTABLE DEVICE | Status: FUNCTIONAL
Brand: TRESTLE LUXE

## 2019-05-22 RX ORDER — NALOXONE HCL 0.4 MG/ML
0.04 VIAL (ML) INJECTION AS NEEDED
Status: DISCONTINUED | OUTPATIENT
Start: 2019-05-22 | End: 2019-05-22 | Stop reason: HOSPADM

## 2019-05-22 RX ORDER — DIPHENHYDRAMINE HCL 25 MG
25 CAPSULE ORAL NIGHTLY PRN
Status: DISCONTINUED | OUTPATIENT
Start: 2019-05-22 | End: 2019-05-23 | Stop reason: HOSPADM

## 2019-05-22 RX ORDER — SODIUM CHLORIDE 9 MG/ML
75 INJECTION, SOLUTION INTRAVENOUS CONTINUOUS
Status: DISCONTINUED | OUTPATIENT
Start: 2019-05-22 | End: 2019-05-23 | Stop reason: HOSPADM

## 2019-05-22 RX ORDER — FAMOTIDINE 10 MG/ML
20 INJECTION, SOLUTION INTRAVENOUS
Status: COMPLETED | OUTPATIENT
Start: 2019-05-22 | End: 2019-05-22

## 2019-05-22 RX ORDER — MIDAZOLAM HYDROCHLORIDE 1 MG/ML
INJECTION INTRAMUSCULAR; INTRAVENOUS AS NEEDED
Status: DISCONTINUED | OUTPATIENT
Start: 2019-05-22 | End: 2019-05-22 | Stop reason: SURG

## 2019-05-22 RX ORDER — CITALOPRAM 20 MG/1
20 TABLET ORAL DAILY
Status: DISCONTINUED | OUTPATIENT
Start: 2019-05-22 | End: 2019-05-23 | Stop reason: HOSPADM

## 2019-05-22 RX ORDER — OXYCODONE HCL 20 MG/1
20 TABLET, FILM COATED, EXTENDED RELEASE ORAL ONCE
Status: DISCONTINUED | OUTPATIENT
Start: 2019-05-22 | End: 2019-05-22

## 2019-05-22 RX ORDER — MAGNESIUM HYDROXIDE 1200 MG/15ML
LIQUID ORAL AS NEEDED
Status: DISCONTINUED | OUTPATIENT
Start: 2019-05-22 | End: 2019-05-22 | Stop reason: HOSPADM

## 2019-05-22 RX ORDER — LIDOCAINE HYDROCHLORIDE 40 MG/ML
SOLUTION TOPICAL AS NEEDED
Status: DISCONTINUED | OUTPATIENT
Start: 2019-05-22 | End: 2019-05-22 | Stop reason: SURG

## 2019-05-22 RX ORDER — PANTOPRAZOLE SODIUM 40 MG/1
40 TABLET, DELAYED RELEASE ORAL DAILY
Status: DISCONTINUED | OUTPATIENT
Start: 2019-05-22 | End: 2019-05-23 | Stop reason: HOSPADM

## 2019-05-22 RX ORDER — PROPOFOL 10 MG/ML
VIAL (ML) INTRAVENOUS AS NEEDED
Status: DISCONTINUED | OUTPATIENT
Start: 2019-05-22 | End: 2019-05-22 | Stop reason: SURG

## 2019-05-22 RX ORDER — FUROSEMIDE 40 MG/1
40 TABLET ORAL DAILY
Status: DISCONTINUED | OUTPATIENT
Start: 2019-05-22 | End: 2019-05-23 | Stop reason: HOSPADM

## 2019-05-22 RX ORDER — SODIUM CHLORIDE 9 MG/ML
INJECTION, SOLUTION INTRAVENOUS AS NEEDED
Status: DISCONTINUED | OUTPATIENT
Start: 2019-05-22 | End: 2019-05-22 | Stop reason: HOSPADM

## 2019-05-22 RX ORDER — PHENYLEPHRINE HCL IN 0.9% NACL 0.8MG/10ML
SYRINGE (ML) INTRAVENOUS AS NEEDED
Status: DISCONTINUED | OUTPATIENT
Start: 2019-05-22 | End: 2019-05-22 | Stop reason: SURG

## 2019-05-22 RX ORDER — LISINOPRIL 20 MG/1
20 TABLET ORAL DAILY
Status: DISCONTINUED | OUTPATIENT
Start: 2019-05-22 | End: 2019-05-23 | Stop reason: HOSPADM

## 2019-05-22 RX ORDER — ONDANSETRON 2 MG/ML
INJECTION INTRAMUSCULAR; INTRAVENOUS AS NEEDED
Status: DISCONTINUED | OUTPATIENT
Start: 2019-05-22 | End: 2019-05-22 | Stop reason: SURG

## 2019-05-22 RX ORDER — SODIUM CHLORIDE 0.9 % (FLUSH) 0.9 %
3 SYRINGE (ML) INJECTION EVERY 12 HOURS SCHEDULED
Status: DISCONTINUED | OUTPATIENT
Start: 2019-05-22 | End: 2019-05-23 | Stop reason: HOSPADM

## 2019-05-22 RX ORDER — ACETAMINOPHEN 500 MG
1000 TABLET ORAL ONCE
Status: COMPLETED | OUTPATIENT
Start: 2019-05-22 | End: 2019-05-22

## 2019-05-22 RX ORDER — SODIUM CHLORIDE 0.9 % (FLUSH) 0.9 %
3 SYRINGE (ML) INJECTION EVERY 12 HOURS SCHEDULED
Status: DISCONTINUED | OUTPATIENT
Start: 2019-05-22 | End: 2019-05-22 | Stop reason: HOSPADM

## 2019-05-22 RX ORDER — SODIUM CHLORIDE, SODIUM LACTATE, POTASSIUM CHLORIDE, CALCIUM CHLORIDE 600; 310; 30; 20 MG/100ML; MG/100ML; MG/100ML; MG/100ML
100 INJECTION, SOLUTION INTRAVENOUS CONTINUOUS
Status: DISCONTINUED | OUTPATIENT
Start: 2019-05-22 | End: 2019-05-23 | Stop reason: HOSPADM

## 2019-05-22 RX ORDER — SODIUM CHLORIDE 0.9 % (FLUSH) 0.9 %
1-10 SYRINGE (ML) INJECTION AS NEEDED
Status: DISCONTINUED | OUTPATIENT
Start: 2019-05-22 | End: 2019-05-22 | Stop reason: HOSPADM

## 2019-05-22 RX ORDER — SODIUM CHLORIDE, SODIUM LACTATE, POTASSIUM CHLORIDE, CALCIUM CHLORIDE 600; 310; 30; 20 MG/100ML; MG/100ML; MG/100ML; MG/100ML
100 INJECTION, SOLUTION INTRAVENOUS CONTINUOUS PRN
Status: DISCONTINUED | OUTPATIENT
Start: 2019-05-22 | End: 2019-05-22 | Stop reason: HOSPADM

## 2019-05-22 RX ORDER — ROCURONIUM BROMIDE 10 MG/ML
INJECTION, SOLUTION INTRAVENOUS AS NEEDED
Status: DISCONTINUED | OUTPATIENT
Start: 2019-05-22 | End: 2019-05-22 | Stop reason: SURG

## 2019-05-22 RX ORDER — CLONIDINE HYDROCHLORIDE 0.1 MG/1
0.1 TABLET ORAL EVERY 12 HOURS SCHEDULED
Status: DISCONTINUED | OUTPATIENT
Start: 2019-05-22 | End: 2019-05-23 | Stop reason: HOSPADM

## 2019-05-22 RX ORDER — SUCCINYLCHOLINE CHLORIDE 20 MG/ML
INJECTION INTRAMUSCULAR; INTRAVENOUS AS NEEDED
Status: DISCONTINUED | OUTPATIENT
Start: 2019-05-22 | End: 2019-05-22 | Stop reason: SURG

## 2019-05-22 RX ORDER — TIZANIDINE 4 MG/1
4 TABLET ORAL EVERY 8 HOURS PRN
Status: DISCONTINUED | OUTPATIENT
Start: 2019-05-22 | End: 2019-05-23 | Stop reason: HOSPADM

## 2019-05-22 RX ORDER — HYDRALAZINE HYDROCHLORIDE 20 MG/ML
5 INJECTION INTRAMUSCULAR; INTRAVENOUS
Status: DISCONTINUED | OUTPATIENT
Start: 2019-05-22 | End: 2019-05-22 | Stop reason: HOSPADM

## 2019-05-22 RX ORDER — ONDANSETRON 2 MG/ML
4 INJECTION INTRAMUSCULAR; INTRAVENOUS AS NEEDED
Status: DISCONTINUED | OUTPATIENT
Start: 2019-05-22 | End: 2019-05-22 | Stop reason: HOSPADM

## 2019-05-22 RX ORDER — OXYCODONE AND ACETAMINOPHEN 10; 325 MG/1; MG/1
1 TABLET ORAL ONCE AS NEEDED
Status: DISCONTINUED | OUTPATIENT
Start: 2019-05-22 | End: 2019-05-22 | Stop reason: HOSPADM

## 2019-05-22 RX ORDER — HYDROMORPHONE HYDROCHLORIDE 1 MG/ML
0.5 INJECTION, SOLUTION INTRAMUSCULAR; INTRAVENOUS; SUBCUTANEOUS
Status: DISCONTINUED | OUTPATIENT
Start: 2019-05-22 | End: 2019-05-22 | Stop reason: HOSPADM

## 2019-05-22 RX ORDER — LIDOCAINE HYDROCHLORIDE 20 MG/ML
INJECTION, SOLUTION INFILTRATION; PERINEURAL AS NEEDED
Status: DISCONTINUED | OUTPATIENT
Start: 2019-05-22 | End: 2019-05-22 | Stop reason: SURG

## 2019-05-22 RX ORDER — MEPERIDINE HYDROCHLORIDE 25 MG/ML
12.5 INJECTION INTRAMUSCULAR; INTRAVENOUS; SUBCUTANEOUS
Status: DISCONTINUED | OUTPATIENT
Start: 2019-05-22 | End: 2019-05-22 | Stop reason: HOSPADM

## 2019-05-22 RX ORDER — IPRATROPIUM BROMIDE AND ALBUTEROL SULFATE 2.5; .5 MG/3ML; MG/3ML
3 SOLUTION RESPIRATORY (INHALATION) ONCE
Status: COMPLETED | OUTPATIENT
Start: 2019-05-22 | End: 2019-05-22

## 2019-05-22 RX ORDER — BUPIVACAINE HCL/0.9 % NACL/PF 0.1 %
2 PLASTIC BAG, INJECTION (ML) EPIDURAL ONCE
Status: COMPLETED | OUTPATIENT
Start: 2019-05-22 | End: 2019-05-22

## 2019-05-22 RX ORDER — METOCLOPRAMIDE HYDROCHLORIDE 5 MG/ML
5 INJECTION INTRAMUSCULAR; INTRAVENOUS
Status: DISCONTINUED | OUTPATIENT
Start: 2019-05-22 | End: 2019-05-22 | Stop reason: HOSPADM

## 2019-05-22 RX ORDER — IPRATROPIUM BROMIDE AND ALBUTEROL SULFATE 2.5; .5 MG/3ML; MG/3ML
3 SOLUTION RESPIRATORY (INHALATION) ONCE AS NEEDED
Status: DISCONTINUED | OUTPATIENT
Start: 2019-05-22 | End: 2019-05-22 | Stop reason: HOSPADM

## 2019-05-22 RX ORDER — LABETALOL HYDROCHLORIDE 5 MG/ML
5 INJECTION, SOLUTION INTRAVENOUS
Status: DISCONTINUED | OUTPATIENT
Start: 2019-05-22 | End: 2019-05-22 | Stop reason: HOSPADM

## 2019-05-22 RX ORDER — SODIUM CHLORIDE 0.9 % (FLUSH) 0.9 %
3-10 SYRINGE (ML) INJECTION AS NEEDED
Status: DISCONTINUED | OUTPATIENT
Start: 2019-05-22 | End: 2019-05-23 | Stop reason: HOSPADM

## 2019-05-22 RX ORDER — FENTANYL CITRATE 50 UG/ML
25 INJECTION, SOLUTION INTRAMUSCULAR; INTRAVENOUS
Status: DISCONTINUED | OUTPATIENT
Start: 2019-05-22 | End: 2019-05-22 | Stop reason: HOSPADM

## 2019-05-22 RX ORDER — CEFAZOLIN SODIUM 1 G/50ML
1 INJECTION, SOLUTION INTRAVENOUS EVERY 8 HOURS
Status: COMPLETED | OUTPATIENT
Start: 2019-05-22 | End: 2019-05-23

## 2019-05-22 RX ORDER — OXYCODONE HCL 20 MG/1
20 TABLET, FILM COATED, EXTENDED RELEASE ORAL ONCE
Status: COMPLETED | OUTPATIENT
Start: 2019-05-22 | End: 2019-05-22

## 2019-05-22 RX ORDER — DEXAMETHASONE SODIUM PHOSPHATE 4 MG/ML
INJECTION, SOLUTION INTRA-ARTICULAR; INTRALESIONAL; INTRAMUSCULAR; INTRAVENOUS; SOFT TISSUE AS NEEDED
Status: DISCONTINUED | OUTPATIENT
Start: 2019-05-22 | End: 2019-05-22 | Stop reason: SURG

## 2019-05-22 RX ORDER — DIGOXIN 250 MCG
250 TABLET ORAL
Status: DISCONTINUED | OUTPATIENT
Start: 2019-05-23 | End: 2019-05-23 | Stop reason: HOSPADM

## 2019-05-22 RX ORDER — FENTANYL CITRATE 50 UG/ML
25 INJECTION, SOLUTION INTRAMUSCULAR; INTRAVENOUS AS NEEDED
Status: DISCONTINUED | OUTPATIENT
Start: 2019-05-22 | End: 2019-05-22 | Stop reason: HOSPADM

## 2019-05-22 RX ORDER — CARVEDILOL 6.25 MG/1
12.5 TABLET ORAL 2 TIMES DAILY WITH MEALS
Status: DISCONTINUED | OUTPATIENT
Start: 2019-05-22 | End: 2019-05-23 | Stop reason: HOSPADM

## 2019-05-22 RX ORDER — ATORVASTATIN CALCIUM 40 MG/1
40 TABLET, FILM COATED ORAL NIGHTLY
Status: DISCONTINUED | OUTPATIENT
Start: 2019-05-22 | End: 2019-05-23 | Stop reason: HOSPADM

## 2019-05-22 RX ORDER — SUFENTANIL CITRATE 50 UG/ML
INJECTION EPIDURAL; INTRAVENOUS AS NEEDED
Status: DISCONTINUED | OUTPATIENT
Start: 2019-05-22 | End: 2019-05-22 | Stop reason: SURG

## 2019-05-22 RX ORDER — OXYCODONE HYDROCHLORIDE 5 MG/1
20 TABLET ORAL EVERY 4 HOURS PRN
Status: DISCONTINUED | OUTPATIENT
Start: 2019-05-22 | End: 2019-05-23 | Stop reason: HOSPADM

## 2019-05-22 RX ORDER — FLUMAZENIL 0.1 MG/ML
0.2 INJECTION INTRAVENOUS AS NEEDED
Status: DISCONTINUED | OUTPATIENT
Start: 2019-05-22 | End: 2019-05-22 | Stop reason: HOSPADM

## 2019-05-22 RX ADMIN — CLONIDINE HYDROCHLORIDE 0.1 MG: 0.1 TABLET ORAL at 22:18

## 2019-05-22 RX ADMIN — SODIUM CHLORIDE, POTASSIUM CHLORIDE, SODIUM LACTATE AND CALCIUM CHLORIDE 100 ML/HR: 600; 310; 30; 20 INJECTION, SOLUTION INTRAVENOUS at 06:10

## 2019-05-22 RX ADMIN — OXYCODONE HYDROCHLORIDE 20 MG: 5 TABLET ORAL at 14:04

## 2019-05-22 RX ADMIN — FUROSEMIDE 40 MG: 40 TABLET ORAL at 13:10

## 2019-05-22 RX ADMIN — SODIUM CHLORIDE, PRESERVATIVE FREE 3 ML: 5 INJECTION INTRAVENOUS at 22:18

## 2019-05-22 RX ADMIN — HYDROMORPHONE HYDROCHLORIDE 1 MG: 1 INJECTION, SOLUTION INTRAMUSCULAR; INTRAVENOUS; SUBCUTANEOUS at 12:28

## 2019-05-22 RX ADMIN — CLONIDINE HYDROCHLORIDE 0.1 MG: 0.1 TABLET ORAL at 13:10

## 2019-05-22 RX ADMIN — SUFENTANIL CITRATE 50 MCG: 50 INJECTION, SOLUTION EPIDURAL; INTRAVENOUS at 07:53

## 2019-05-22 RX ADMIN — HYDROMORPHONE HYDROCHLORIDE 1 MG: 1 INJECTION, SOLUTION INTRAMUSCULAR; INTRAVENOUS; SUBCUTANEOUS at 22:19

## 2019-05-22 RX ADMIN — LIDOCAINE HYDROCHLORIDE 1 EACH: 40 SOLUTION TOPICAL at 07:11

## 2019-05-22 RX ADMIN — Medication 80 MCG: at 08:11

## 2019-05-22 RX ADMIN — PANTOPRAZOLE SODIUM 40 MG: 40 TABLET, DELAYED RELEASE ORAL at 13:10

## 2019-05-22 RX ADMIN — ACETAMINOPHEN 1000 MG: 500 TABLET, FILM COATED ORAL at 06:54

## 2019-05-22 RX ADMIN — ROCURONIUM BROMIDE 10 MG: 10 INJECTION INTRAVENOUS at 07:10

## 2019-05-22 RX ADMIN — SODIUM CHLORIDE, POTASSIUM CHLORIDE, SODIUM LACTATE AND CALCIUM CHLORIDE 100 ML/HR: 600; 310; 30; 20 INJECTION, SOLUTION INTRAVENOUS at 06:11

## 2019-05-22 RX ADMIN — Medication 2 G: at 07:25

## 2019-05-22 RX ADMIN — OXYCODONE HYDROCHLORIDE 20 MG: 5 TABLET ORAL at 23:18

## 2019-05-22 RX ADMIN — CITALOPRAM 20 MG: 20 TABLET, FILM COATED ORAL at 13:10

## 2019-05-22 RX ADMIN — SUCCINYLCHOLINE CHLORIDE 200 MG: 20 INJECTION, SOLUTION INTRAMUSCULAR; INTRAVENOUS at 07:10

## 2019-05-22 RX ADMIN — MIDAZOLAM HYDROCHLORIDE 5 MG: 1 INJECTION, SOLUTION INTRAMUSCULAR; INTRAVENOUS at 07:08

## 2019-05-22 RX ADMIN — SUFENTANIL CITRATE 50 MCG: 50 INJECTION, SOLUTION EPIDURAL; INTRAVENOUS at 07:35

## 2019-05-22 RX ADMIN — CARVEDILOL 12.5 MG: 6.25 TABLET, FILM COATED ORAL at 18:01

## 2019-05-22 RX ADMIN — ONDANSETRON HYDROCHLORIDE 4 MG: 2 SOLUTION INTRAMUSCULAR; INTRAVENOUS at 08:40

## 2019-05-22 RX ADMIN — OXYCODONE HYDROCHLORIDE 20 MG: 5 TABLET ORAL at 18:04

## 2019-05-22 RX ADMIN — LISINOPRIL 20 MG: 20 TABLET ORAL at 13:09

## 2019-05-22 RX ADMIN — PROPOFOL 200 MG: 10 INJECTION, EMULSION INTRAVENOUS at 07:10

## 2019-05-22 RX ADMIN — HYDROMORPHONE HYDROCHLORIDE 1 MG: 1 INJECTION, SOLUTION INTRAMUSCULAR; INTRAVENOUS; SUBCUTANEOUS at 16:39

## 2019-05-22 RX ADMIN — DEXAMETHASONE SODIUM PHOSPHATE 8 MG: 4 INJECTION, SOLUTION INTRAMUSCULAR; INTRAVENOUS at 08:40

## 2019-05-22 RX ADMIN — CEFAZOLIN SODIUM 1 G: 1 INJECTION, SOLUTION INTRAVENOUS at 23:17

## 2019-05-22 RX ADMIN — IPRATROPIUM BROMIDE AND ALBUTEROL SULFATE 3 ML: 2.5; .5 SOLUTION RESPIRATORY (INHALATION) at 06:54

## 2019-05-22 RX ADMIN — SUFENTANIL CITRATE 50 MCG: 50 INJECTION, SOLUTION EPIDURAL; INTRAVENOUS at 07:10

## 2019-05-22 RX ADMIN — FAMOTIDINE 20 MG: 10 INJECTION INTRAVENOUS at 06:54

## 2019-05-22 RX ADMIN — ATORVASTATIN CALCIUM 40 MG: 40 TABLET, FILM COATED ORAL at 22:19

## 2019-05-22 RX ADMIN — SODIUM CHLORIDE 75 ML/HR: 9 INJECTION, SOLUTION INTRAVENOUS at 13:12

## 2019-05-22 RX ADMIN — CEFAZOLIN SODIUM 1 G: 1 INJECTION, SOLUTION INTRAVENOUS at 15:33

## 2019-05-22 RX ADMIN — OXYCODONE HYDROCHLORIDE 20 MG: 20 TABLET, FILM COATED, EXTENDED RELEASE ORAL at 06:10

## 2019-05-22 RX ADMIN — LIDOCAINE HYDROCHLORIDE 100 MG: 20 INJECTION, SOLUTION INFILTRATION; PERINEURAL at 07:10

## 2019-05-22 NOTE — ANESTHESIA POSTPROCEDURE EVALUATION
Patient: Juan A Chavez    Procedure Summary     Date:  05/22/19 Room / Location:   [facility] OR 05 /  [facility] OR    Anesthesia Start:  0659 Anesthesia Stop:  0937    Procedures:       ANTERIOR CERVICAL DISCECTOMY FUSION C4-5, EXPLORATION OF FUSION C5-7 (N/A Spine Cervical)      REMOVAL OF INSTRUMENTATION (N/A Spine Cervical) Diagnosis:  (M54.12)    Surgeon:  SHARITA Monahan MD Provider:  Marilyn Gamboa CRNA    Anesthesia Type:  general ASA Status:  3          Anesthesia Type: general  Last vitals  BP   164/68 (05/22/19 1145)   Temp   97.4 °F (36.3 °C) (05/22/19 1145)   Pulse   60 (05/22/19 1145)   Resp   14 (05/22/19 1145)     SpO2   97 % (05/22/19 1145)     Post Anesthesia Care and Evaluation    Patient location during evaluation: PACU  Patient participation: complete - patient participated  Level of consciousness: awake and alert  Pain management: adequate  Airway patency: patent  Anesthetic complications: No anesthetic complications    Cardiovascular status: acceptable  Respiratory status: acceptable  Hydration status: acceptable    Comments: Blood pressure 164/68, pulse 60, temperature 97.4 °F (36.3 °C), temperature source Oral, resp. rate 14, SpO2 97 %.    Pt discharged from PACU based on caridad score >8

## 2019-05-22 NOTE — ANESTHESIA PROCEDURE NOTES
Airway  Urgency: elective    Airway not difficult    General Information and Staff    Patient location during procedure: OR  CRNA: Marilyn Gamoba CRNA    Indications and Patient Condition  Indications for airway management: airway protection    Preoxygenated: yes  MILS maintained throughout  Mask difficulty assessment: 1 - vent by mask    Final Airway Details  Final airway type: endotracheal airway      Successful airway: ETT  Cuffed: yes   Successful intubation technique: direct laryngoscopy  Facilitating devices/methods: intubating stylet  Endotracheal tube insertion site: oral  Blade: Varma  Blade size: 2  ETT size (mm): 7.0  Cormack-Lehane Classification: grade I - full view of glottis  Placement verified by: chest auscultation, capnometry and palpation of cuff   Cuff volume (mL): 7  Measured from: teeth  ETT to teeth (cm): 22  Number of attempts at approach: 1

## 2019-05-22 NOTE — ANESTHESIA PREPROCEDURE EVALUATION
Anesthesia Evaluation     no history of anesthetic complications:  NPO Solid Status: > 8 hours  NPO Liquid Status: > 8 hours           Airway   Mallampati: II  TM distance: >3 FB  Neck ROM: full  No difficulty expected  Dental      Pulmonary    (+) a smoker Current Smoked day of surgery, COPD (uses o2 at night),   Cardiovascular   Exercise tolerance: poor (<4 METS)    ECG reviewed  Patient on routine beta blocker and Beta blocker given within 24 hours of surgery    (+) pacemaker (interrogation report 3/2019- [facility] icd, 23% A-paced, <1% v-paced) ICD, hypertension, CAD, CABG (2013), cardiac stents (3/2017) more than 12 months ago dysrhythmias Atrial Fib, CHF (chronic, systolic), hyperlipidemia,     ROS comment: EF 35-40% on echo, 2014    Neuro/Psych  (+) psychiatric history (PTSD) Anxiety and Depression,     (-) seizures, TIA, CVA    ROS Comment: Concussion 2013, pt reports problems with memory  GI/Hepatic/Renal/Endo    (+)  GERD,    (-) liver disease, no renal disease, diabetes    Musculoskeletal     (+) back pain, chronic pain,   Abdominal    Substance History      OB/GYN          Other   (+) arthritis                     Anesthesia Plan    ASA 3     general   (Place magnet on ICD, interrogate device in recovery. )  intravenous induction   Anesthetic plan, all risks, benefits, and alternatives have been provided, discussed and informed consent has been obtained with: patient.

## 2019-05-22 NOTE — ANESTHESIA PROCEDURE NOTES
Arterial Line      Patient location during procedure: holding area   Line placed for hemodynamic monitoring.  Performed By   Anesthesiologist: Kyra Broussard MD  Preanesthetic Checklist  Completed: patient identified, site marked, surgical consent, pre-op evaluation, timeout performed, IV checked, risks and benefits discussed and monitors and equipment checked  Arterial Line Prep   Sterile Tech: cap, gloves and sterile barriers  Prep: ChloraPrep  Patient monitoring: EKG, continuous pulse oximetry and blood pressure monitoring  Arterial Line Procedure   Laterality:right  Location:  radial artery  Catheter size: 20 G   Guidance: ultrasound guided  PROCEDURE NOTE/ULTRASOUND INTERPRETATION.  Using ultrasound guidance the potential vascular sites for insertion of the catheter were visualized to determine the patency of the vessel to be used for vascular access.  After selecting the appropriate site for insertion, the needle was visualized under ultrasound being inserted into the radial artery, followed by ultrasound confirmation of wire and catheter placement. There were no abnormalities seen on ultrasound; an image was taken; and the patient tolerated the procedure with no complications.   Number of attempts: 1  Successful placement: yes          Post Assessment   Dressing Type: wrist guard applied, secured with tape and occlusive dressing applied.   Complications no  Circ/Move/Sens Assessment: normal and unchanged.   Patient Tolerance: patient tolerated the procedure well with no apparent complications

## 2019-05-22 NOTE — ANESTHESIA POSTPROCEDURE EVALUATION
Patient: Juan A Chavez    Procedure Summary     Date:  05/22/19 Room / Location:   [facility] OR  /  [facility] OR    Anesthesia Start:  0659 Anesthesia Stop:      Procedures:       ANTERIOR CERVICAL DISCECTOMY FUSION C4-5, EXPLORATION OF FUSION C5-7 (N/A Spine Cervical)      REMOVAL OF INSTRUMENTATION (N/A Spine Cervical) Diagnosis:  (M54.12)    Surgeon:  SHARITA Monahan MD Provider:  Marilyn Gamboa CRNA    Anesthesia Type:  general ASA Status:  3          Anesthesia Type: general  Last vitals  BP   160/67 (05/22/19 0545)   Temp   97.9 °F (36.6 °C) (05/22/19 0545)   Pulse   60 (05/22/19 0626)   Resp   16 (05/22/19 0545)     SpO2   95 % (05/22/19 0626)     Anesthesia Post Evaluation

## 2019-05-23 VITALS
DIASTOLIC BLOOD PRESSURE: 92 MMHG | TEMPERATURE: 98.2 F | OXYGEN SATURATION: 96 % | RESPIRATION RATE: 20 BRPM | SYSTOLIC BLOOD PRESSURE: 188 MMHG | HEART RATE: 60 BPM

## 2019-05-23 LAB
ANION GAP SERPL CALCULATED.3IONS-SCNC: 7 MMOL/L (ref 4–13)
BASOPHILS # BLD AUTO: 0.03 10*3/MM3 (ref 0–0.2)
BASOPHILS NFR BLD AUTO: 0.2 % (ref 0–2)
BUN BLD-MCNC: 14 MG/DL (ref 5–21)
BUN/CREAT SERPL: 30.4 (ref 7–25)
CALCIUM SPEC-SCNC: 8.9 MG/DL (ref 8.4–10.4)
CHLORIDE SERPL-SCNC: 103 MMOL/L (ref 98–110)
CO2 SERPL-SCNC: 31 MMOL/L (ref 24–31)
CREAT BLD-MCNC: 0.46 MG/DL (ref 0.5–1.4)
DEPRECATED RDW RBC AUTO: 47.8 FL (ref 40–54)
EOSINOPHIL # BLD AUTO: 0.01 10*3/MM3 (ref 0–0.7)
EOSINOPHIL NFR BLD AUTO: 0.1 % (ref 0–4)
ERYTHROCYTE [DISTWIDTH] IN BLOOD BY AUTOMATED COUNT: 13.3 % (ref 12–15)
GFR SERPL CREATININE-BSD FRML MDRD: >150 ML/MIN/1.73
GLUCOSE BLD-MCNC: 108 MG/DL (ref 70–100)
HCT VFR BLD AUTO: 40.9 % (ref 40–52)
HGB BLD-MCNC: 13.4 G/DL (ref 14–18)
IMM GRANULOCYTES # BLD AUTO: 0.11 10*3/MM3 (ref 0–0.05)
IMM GRANULOCYTES NFR BLD AUTO: 0.6 % (ref 0–5)
LYMPHOCYTES # BLD AUTO: 1.42 10*3/MM3 (ref 0.72–4.86)
LYMPHOCYTES NFR BLD AUTO: 7.6 % (ref 15–45)
MCH RBC QN AUTO: 31.9 PG (ref 28–32)
MCHC RBC AUTO-ENTMCNC: 32.8 G/DL (ref 33–36)
MCV RBC AUTO: 97.4 FL (ref 82–95)
MONOCYTES # BLD AUTO: 0.99 10*3/MM3 (ref 0.19–1.3)
MONOCYTES NFR BLD AUTO: 5.3 % (ref 4–12)
NEUTROPHILS # BLD AUTO: 16.06 10*3/MM3 (ref 1.87–8.4)
NEUTROPHILS NFR BLD AUTO: 86.2 % (ref 39–78)
NRBC BLD AUTO-RTO: 0 /100 WBC (ref 0–0.2)
PLATELET # BLD AUTO: 174 10*3/MM3 (ref 130–400)
PMV BLD AUTO: 11.4 FL (ref 6–12)
POTASSIUM BLD-SCNC: 3.9 MMOL/L (ref 3.5–5.3)
RBC # BLD AUTO: 4.2 10*6/MM3 (ref 4.8–5.9)
SODIUM BLD-SCNC: 141 MMOL/L (ref 135–145)
WBC NRBC COR # BLD: 18.62 10*3/MM3 (ref 4.8–10.8)

## 2019-05-23 PROCEDURE — 85025 COMPLETE CBC W/AUTO DIFF WBC: CPT | Performed by: ORTHOPAEDIC SURGERY

## 2019-05-23 PROCEDURE — 25010000003 CEFAZOLIN 1-4 GM/50ML-% SOLUTION: Performed by: ORTHOPAEDIC SURGERY

## 2019-05-23 PROCEDURE — 80048 BASIC METABOLIC PNL TOTAL CA: CPT | Performed by: ORTHOPAEDIC SURGERY

## 2019-05-23 RX ORDER — DIAZEPAM 10 MG/1
10 TABLET ORAL EVERY 8 HOURS PRN
Qty: 21 TABLET | Refills: 0
Start: 2019-05-23 | End: 2019-06-26

## 2019-05-23 RX ORDER — OXYCODONE HYDROCHLORIDE 20 MG/1
20 TABLET ORAL EVERY 4 HOURS PRN
Qty: 42 TABLET | Refills: 0
Start: 2019-05-23 | End: 2019-06-01

## 2019-05-23 RX ADMIN — HYDROMORPHONE HYDROCHLORIDE 1 MG: 1 INJECTION, SOLUTION INTRAMUSCULAR; INTRAVENOUS; SUBCUTANEOUS at 04:02

## 2019-05-23 RX ADMIN — PANTOPRAZOLE SODIUM 40 MG: 40 TABLET, DELAYED RELEASE ORAL at 08:09

## 2019-05-23 RX ADMIN — CITALOPRAM 20 MG: 20 TABLET, FILM COATED ORAL at 08:10

## 2019-05-23 RX ADMIN — CARVEDILOL 12.5 MG: 6.25 TABLET, FILM COATED ORAL at 08:10

## 2019-05-23 RX ADMIN — FUROSEMIDE 40 MG: 40 TABLET ORAL at 08:09

## 2019-05-23 RX ADMIN — OXYCODONE HYDROCHLORIDE 20 MG: 5 TABLET ORAL at 05:57

## 2019-05-23 RX ADMIN — CEFAZOLIN SODIUM 1 G: 1 INJECTION, SOLUTION INTRAVENOUS at 07:46

## 2019-05-23 RX ADMIN — CLONIDINE HYDROCHLORIDE 0.1 MG: 0.1 TABLET ORAL at 08:10

## 2019-05-23 RX ADMIN — SODIUM CHLORIDE 75 ML/HR: 9 INJECTION, SOLUTION INTRAVENOUS at 03:54

## 2019-05-24 ENCOUNTER — READMISSION MANAGEMENT (OUTPATIENT)
Dept: CALL CENTER | Facility: HOSPITAL | Age: 54
End: 2019-05-24

## 2019-05-24 ENCOUNTER — NURSE TRIAGE (OUTPATIENT)
Dept: CALL CENTER | Facility: HOSPITAL | Age: 54
End: 2019-05-24

## 2019-05-24 NOTE — OUTREACH NOTE
Prep Survey      Responses   Facility patient discharged from?  [facility]   Is patient eligible?  Yes   Discharge diagnosis  Recurrent neck pain, stepan. shoulder and  upper arm radiculopathy, s/p removal of ant. instrumentation C5-7, exploration of fusion C5-7, ant. cervical discectomy with interbody fusion  C4-5, ant. spinal insturmentation C4-6   Does the patient have one of the following disease processes/diagnoses(primary or secondary)?  General Surgery   Does the patient have Home health ordered?  No   Is there a DME ordered?  No   Comments regarding appointments  See AVS   Prep survey completed?  Yes          Enedina Adam RN

## 2019-05-24 NOTE — PROGRESS NOTES
Dual Chamber AICD Evaluation Report  REMOTE MERLIN ON DEMAND    May 22, 2019    Primary Cardiologist: Eri  : [employer] Medical Model: Ellipse DR 2411-36Q ICD  Implant date: 4/18/2014    Reason for evaluation: remote report sent from [facility] PACU on 5/22/2019  Indication for AICD: primary prevention of sudden cardiac death    Measurements  Atrial sensing - P wave: 3 mV  Atrial threshold: N/P  Atrial lead impedance: 540 ohms  Ventricular sensing - R wave: 11.7 mV  Ventricular threshold: N/P  Ventricular lead impedance:  450 ohms  Shock impedance:  RV- 48 ohms      Diagnostic Data  Atrial paced: 24 %  Ventricular paced: <1 %    Episodes recorded since 3/11/2019:  All AMS episodes are noise on both leads from surgical intervention on 5/22/2019  No ventricular high rates, ATP or shocks.    Battery status: satisfactory  Estimated 3.7-4.4 years remaining      Final Parameters  Mode: DDD  Lower rate: 60 bpm Upper rate: 120 bpm  AV Delay: Paced- 200 ms    Sensed- 200 ms     Atrial - Amplitude: 2.5 V Pulse width: 0.5 ms Sensitivity: Auto mV   Ventricular - Amplitude: 2.5 V Pulse width: 0.5 ms Sensitivity: Auto mV   Changes made: No changes made.  Conclusions: normal AICD function, no therapy delivered, stable sensing thresholds and adequate battery reserve    Follow up: Every 3 months via Merlin, annually in office

## 2019-05-25 ENCOUNTER — READMISSION MANAGEMENT (OUTPATIENT)
Dept: CALL CENTER | Facility: HOSPITAL | Age: 54
End: 2019-05-25

## 2019-05-25 NOTE — TELEPHONE ENCOUNTER
"Referred caller to SCOUPY in service video on how to adjust an [place] collar. Caller states he access to smart phone so that he watch this.     Reason for Disposition  • Health Information question, no triage required and triager able to answer question    Additional Information  • Negative: [1] Caller is not with the adult (patient) AND [2] reporting urgent symptoms  • Negative: Lab result questions  • Negative: Medication questions  • Negative: Caller cannot be reached by phone  • Negative: Caller has already spoken to PCP or another triager  • Negative: RN needs further essential information from caller in order to complete triage  • Negative: Requesting regular office appointment  • Negative: [1] Caller requesting NON-URGENT health information AND [2] PCP's office is the best resource    Answer Assessment - Initial Assessment Questions  1. REASON FOR CALL or QUESTION: \"What is your reason for calling today?\" or \"How can I best help you?\" or \"What question do you have that I can help answer?\"      Caller states he is wearing an [place] collar postop his cervical fusion. States he called the surgeon's office and the staff told him he could adjust the collar using the button on the front but he is unsure how to do this.    Protocols used: INFORMATION ONLY CALL-ADULT-      "

## 2019-05-25 NOTE — OUTREACH NOTE
General Surgery Week 1 Survey      Responses   Facility patient discharged from?  [facility] [facility]   Does the patient have one of the following disease processes/diagnoses(primary or secondary)?  General Surgery   Is there a successful TCM telephone encounter documented?  No   Week 1 attempt successful?  Yes   Call start time  1203   Call end time  1207   Discharge diagnosis  Recurrent neck pain, stepan. shoulder and  upper arm radiculopathy, s/p removal of ant. instrumentation C5-7, exploration of fusion C5-7, ant. cervical discectomy with interbody fusion  C4-5, ant. spinal insturmentation C4-6   Meds reviewed with patient/caregiver?  Yes   Is the patient having any side effects they believe may be caused by any medication additions or changes?  No   Does the patient have all medications related to this admission filled (includes all antibiotics, pain medications, etc.)  Yes   Is the patient taking all medications as directed (includes completed medication regime)?  Yes   Does the patient have a follow up appointment scheduled with their surgeon?  Yes   Has the patient kept scheduled appointments due by today?  Yes   Has home health visited the patient within 72 hours of discharge?  N/A   Psychosocial issues?  No   Did the patient receive a copy of their discharge instructions?  Yes   Nursing interventions  Reviewed instructions with patient   What is the patient's perception of their health status since discharge?  Improving   Nursing interventions  Nurse provided patient education   Is the patient /caregiver able to teach back basic post-op care?  Take showers only when approved by MD-sponge bathe until then, No tub bath, swimming, or hot tub until instructed by MD, Drive as instructed by MD in discharge instructions, Keep incision areas clean,dry and protected, Lifting as instructed by MD in discharge instructions   Is the patient/caregiver able to teach back signs and symptoms of incisional infection?  Increased redness,  swelling or pain at the incisonal site, Fever, Increased drainage or bleeding, Pus or odor from incision   Is the patient/caregiver able to teach back steps to recovery at home?  Set small, achievable goals for return to baseline health, Rest and rebuild strength, gradually increase activity   Week 1 call completed?  Yes          Boston Galan, ESPERANZA

## 2019-05-28 NOTE — PROGRESS NOTES
I have reviewed the notes, assessments, and/or procedures performed by Agustina Moreland, I concur with her/his documentation of Juan A Chavez.

## 2019-06-04 ENCOUNTER — READMISSION MANAGEMENT (OUTPATIENT)
Dept: CALL CENTER | Facility: HOSPITAL | Age: 54
End: 2019-06-04

## 2019-06-04 NOTE — OUTREACH NOTE
General Surgery Week 2 Survey      Responses   Facility patient discharged from?  [facility]   Does the patient have one of the following disease processes/diagnoses(primary or secondary)?  General Surgery   Week 2 attempt successful?  Yes   Call start time  1202   Call end time  1206   Discharge diagnosis  Recurrent neck pain, stepan. shoulder and  upper arm radiculopathy, s/p removal of ant. instrumentation C5-7, exploration of fusion C5-7, ant. cervical discectomy with interbody fusion  C4-5, ant. spinal insturmentation C4-6   Is patient permission given to speak with other caregiver?  Yes   List who call center can speak with  wife   Meds reviewed with patient/caregiver?  Yes   Is the patient having any side effects they believe may be caused by any medication additions or changes?  No   Does the patient have all medications related to this admission filled (includes all antibiotics, pain medications, etc.)  Yes   Is the patient taking all medications as directed (includes completed medication regime)?  Yes   Does the patient have a follow up appointment scheduled with their surgeon?  Yes   Has the patient kept scheduled appointments due by today?  Yes   Psychosocial issues?  No   Comments  anterior incision is covered with surgical dressing, no s/sx of inf around dressing. Appetite is wax/wain, still having some tenderness with swallowing certain foods.Pain is tolerable. Denies N/V.    What is the patient's perception of their health status since discharge?  Improving   Is the patient /caregiver able to teach back basic post-op care?  Practice 'cough and deep breath', Drive as instructed by MD in discharge instructions, Keep incision areas clean,dry and protected   Is the patient/caregiver able to teach back signs and symptoms of incisional infection?  Increased redness, swelling or pain at the incisonal site, Incisional warmth   Is the patient/caregiver able to teach back steps to recovery at home?  Set small,  achievable goals for return to baseline health, Eat a well-balance diet   Is the patient/caregiver able to teach back the hierarchy of who to call/visit for symptoms/problems? PCP, Specialist, Home health nurse, Urgent Care, ED, 911  Yes   Week 2 call completed?  Yes          Ivania Carpenter, RN

## 2019-06-10 ENCOUNTER — CLINICAL SUPPORT (OUTPATIENT)
Dept: CARDIOLOGY | Facility: CLINIC | Age: 54
End: 2019-06-10

## 2019-06-10 DIAGNOSIS — Z95.810 AICD (AUTOMATIC CARDIOVERTER/DEFIBRILLATOR) PRESENT: ICD-10-CM

## 2019-06-10 DIAGNOSIS — I50.22 CHRONIC SYSTOLIC CONGESTIVE HEART FAILURE (HCC): ICD-10-CM

## 2019-06-10 PROCEDURE — 93289 INTERROG DEVICE EVAL HEART: CPT | Performed by: NURSE PRACTITIONER

## 2019-06-10 NOTE — PROGRESS NOTES
[employer] AICD Evaluation Report    Lena 10, 2019    Indication for AICD: Primary Prevention of Sudden Cardiac Death    Implanting MD: Dr. Blackmon    : [employer] Eric Model: Ellipse DR 2411-36Q    Implant Date: 4/18/2014    Reason For Evaluation: routine      DIAGNOSTIC DATA    Atrial paced: 24%  Ventricular paced: less than 1 %    Battery status: satisfactory    Estimated battery life: 4 years (3.7-4.4 years)      FINAL PARAMETERS    Changes made: none    Conclusions: normal AICD function, stable pacing and sensing thresholds, adequate battery reserve and no shocks or ATP delivered     Pt has [employer] atrial lead and [employer]. Eric AICD and ventricular lead - unlikely to be MRI compatible. Has concussion syndrome.     Hasn't been able to get Merlin set up - living at his mother's and doesn't get good cell signal and doesn't have wifi at this time.     Return in about 3 months (around 9/10/2019) for Pacemaker Clinic ([employer] AICD).      Rose Wolf APRN, ACNP-BC, CHFN-BC

## 2019-06-12 ENCOUNTER — READMISSION MANAGEMENT (OUTPATIENT)
Dept: CALL CENTER | Facility: HOSPITAL | Age: 54
End: 2019-06-12

## 2019-06-12 NOTE — OUTREACH NOTE
General Surgery Week 3 Survey      Responses   Facility patient discharged from?  [facility]   Does the patient have one of the following disease processes/diagnoses(primary or secondary)?  General Surgery   Week 3 attempt successful?  No   Unsuccessful attempts  Attempt 1          Tanya Rivera RN

## 2019-06-21 ENCOUNTER — TELEPHONE (OUTPATIENT)
Dept: CARDIOLOGY | Facility: CLINIC | Age: 54
End: 2019-06-21

## 2019-06-21 NOTE — TELEPHONE ENCOUNTER
Patient called and is out of [employer].  He has an appt for the 26th to see Dr. Hwang.      He will be by to  samples this afternoon.    He knows he has to be here for appt to get refills.     Thanks,  Emerson.

## 2019-06-21 NOTE — TELEPHONE ENCOUNTER
Pt's son came in the office to pick samples up for father for eliquis 5 mg gave 2 boxes pt is aware that he has to keep apt on 06/26/2019

## 2019-06-26 ENCOUNTER — OFFICE VISIT (OUTPATIENT)
Dept: CARDIOLOGY | Facility: CLINIC | Age: 54
End: 2019-06-26

## 2019-06-26 VITALS
HEART RATE: 60 BPM | DIASTOLIC BLOOD PRESSURE: 60 MMHG | WEIGHT: 191 LBS | HEIGHT: 70 IN | SYSTOLIC BLOOD PRESSURE: 136 MMHG | BODY MASS INDEX: 27.35 KG/M2 | OXYGEN SATURATION: 95 %

## 2019-06-26 DIAGNOSIS — Z95.810 AICD (AUTOMATIC CARDIOVERTER/DEFIBRILLATOR) PRESENT: ICD-10-CM

## 2019-06-26 DIAGNOSIS — I25.810 CORONARY ARTERY DISEASE INVOLVING CORONARY BYPASS GRAFT OF NATIVE HEART WITHOUT ANGINA PECTORIS: Primary | ICD-10-CM

## 2019-06-26 DIAGNOSIS — I48.0 PAROXYSMAL ATRIAL FIBRILLATION (HCC): ICD-10-CM

## 2019-06-26 DIAGNOSIS — I10 ESSENTIAL HYPERTENSION: ICD-10-CM

## 2019-06-26 DIAGNOSIS — Z72.0 TOBACCO ABUSE: ICD-10-CM

## 2019-06-26 DIAGNOSIS — I50.22 CHRONIC SYSTOLIC CONGESTIVE HEART FAILURE (HCC): ICD-10-CM

## 2019-06-26 DIAGNOSIS — E78.5 DYSLIPIDEMIA: ICD-10-CM

## 2019-06-26 PROCEDURE — 99214 OFFICE O/P EST MOD 30 MIN: CPT | Performed by: INTERNAL MEDICINE

## 2019-06-26 PROCEDURE — 99406 BEHAV CHNG SMOKING 3-10 MIN: CPT | Performed by: INTERNAL MEDICINE

## 2019-06-26 RX ORDER — ATORVASTATIN CALCIUM 10 MG/1
10 TABLET, FILM COATED ORAL NIGHTLY
Qty: 30 TABLET | Refills: 11 | Status: SHIPPED | OUTPATIENT
Start: 2019-06-26

## 2019-06-26 RX ORDER — OXYCODONE AND ACETAMINOPHEN 10; 325 MG/1; MG/1
TABLET ORAL EVERY 6 HOURS
COMMUNITY

## 2019-06-26 NOTE — PROGRESS NOTES
Reason for Visit: cardiovascular follow up.    HPI:  Juan A Chavez is a 54 y.o. male is here today for follow-up.  He has not had any recent cardiac issues.  He denies any chest pain, palpitations, dizziness, syncope, PND, or orthopnea.  He has a lot of problems with chronic pain and may need to have surgery again at some point in the future.  He continues to smoke heavily and is not ready to quit at this time.  He also uses marijuana.  His blood pressure has been well controlled at home.  His breathing is at baseline.  He has not had any significant swelling or weight changes.  He is trying to wean off his narcotics.    Previous Cardiac Testing and Procedures:  - CABG (2013) at [facility].    - Nuclear stress (5/28/14) fixed inferior defect consistent with scar, negative for ischemia, EF 35-40%.   - Echo (7/19/14) mild to moderately enlarged LV, EF 30-35%  - Lipid panel (2/15/16) 95/28/52/123  - BNP (02/14/2016) 2020  - Echo (3/28/2017) EF 25-30%, severe LV dilation, grade I diastolic dysfunction, basal anteroseptal and basal to mid inferoseptal akinesis, mild MR, mild LA dilation, mild MR and TR.  - Nuclear stress (11/20/2017) EF 33%, fixed inferior defect consistent with scar, small size, mild intensity reversible defect in the mid to distal anterior septal region concerning for ischemia  - [facility] (11/21/2017) severe 2 vessel native CAD, patent LIMA to D1, patent SVG Y graft to OM 1 and OM 2/LPL with severe stenosis of native OM 2 just distal to the anastomotic site of the vein graft, status post PCI with 2.5 x 15 mm Xience AMAYA  - ProBNP (11/19/2017) 2640  - Lipid panel (11/20/2017) total cholesterol 89, HDL 17, LDL 61, triglycerides 108      Patient Active Problem List   Diagnosis   • Chronic systolic (congestive) heart failure   • Paroxysmal atrial fibrillation (CMS/HCC)   • GERD (gastroesophageal reflux disease)   • COPD (chronic obstructive pulmonary disease) (CMS/HCC)   • Tobacco abuse   • Essential  hypertension   • Dyslipidemia   • Anxiety   • Chronic left shoulder pain   • S/P CABG x 3   • S/P coronary artery stent placement   • Other chronic pain   • Coronary artery disease involving coronary bypass graft of native heart without angina pectoris   • AICD (automatic cardioverter/defibrillator) present   • DDD (degenerative disc disease), cervical       Social History     Tobacco Use   • Smoking status: Current Every Day Smoker     Packs/day: 1.50     Years: 30.00     Pack years: 45.00     Types: Cigarettes   • Smokeless tobacco: Never Used   Substance Use Topics   • Alcohol use: No     Frequency: Never   • Drug use: Yes     Types: Marijuana     Comment: Vape       Family History   Problem Relation Age of Onset   • Hypertension Other    • Heart disease Other    • Hyperlipidemia Other    • Heart disease Mother    • Stroke Mother    • No Known Problems Father        The following portions of the patient's history were reviewed and updated as appropriate: allergies, current medications, past family history, past medical history, past social history, past surgical history and problem list.      Current Outpatient Medications:   •  aspirin 81 MG tablet, Take 81 mg by mouth Daily. HELD X 5 DAYS ,STOPPED 16TH, Disp: 30 tablet, Rfl: 11  •  atorvastatin (LIPITOR) 10 MG tablet, Take 1 tablet by mouth Every Night., Disp: 30 tablet, Rfl: 11  •  carvedilol (COREG) 12.5 MG tablet, Take 1 tablet by mouth 2 (Two) Times a Day With Meals., Disp: 60 tablet, Rfl: 11  •  citalopram (CeleXA) 20 MG tablet, Take 20 mg by mouth Daily. TAKING 30 MG, Disp: , Rfl: 0  •  CloNIDine (CATAPRES) 0.1 MG tablet, Take 0.1 mg by mouth 2 (Two) Times a Day., Disp: , Rfl:   •  DIGITEK 250 MCG tablet, Take 250 mcg by mouth Daily., Disp: , Rfl: 0  •  ELIQUIS 5 MG tablet tablet, Take 1 tablet by mouth 2 (Two) Times a Day. (Patient taking differently: Take 5 mg by mouth 2 (Two) Times a Day. STOPPED X 5 DAYS, THURS 16TH), Disp: 60 tablet, Rfl: 11  •   "furosemide (LASIX) 40 MG tablet, Take 1 tablet by mouth Daily., Disp: 30 tablet, Rfl: 11  •  lisinopril (PRINIVIL,ZESTRIL) 40 MG tablet, Take 1 tablet by mouth Daily. (Patient taking differently: Take 20 mg by mouth Daily.), Disp: 30 tablet, Rfl: 11  •  oxyCODONE-acetaminophen (PERCOCET)  MG per tablet, Every 6 (Six) Hours., Disp: , Rfl:   •  pantoprazole (PROTONIX) 40 MG EC tablet, Take 40 mg by mouth Daily., Disp: , Rfl: 0    Review of Systems   Constitution: Negative for chills and fever.   Cardiovascular: Negative for chest pain and paroxysmal nocturnal dyspnea.   Respiratory: Negative for cough and shortness of breath.    Skin: Negative for rash.   Musculoskeletal: Positive for back pain and joint pain.   Gastrointestinal: Negative for abdominal pain and heartburn.   Neurological: Negative for dizziness and numbness.       Objective   /60 (BP Location: Left arm, Patient Position: Sitting, Cuff Size: Adult)   Pulse 60   Ht 177.8 cm (70\")   Wt 86.6 kg (191 lb)   SpO2 95%   BMI 27.41 kg/m²   Physical Exam   Constitutional: He is oriented to person, place, and time. He appears well-developed and well-nourished.   HENT:   Head: Normocephalic and atraumatic.   Cardiovascular: Normal rate, regular rhythm and normal heart sounds.   No murmur heard.  Pulmonary/Chest: Effort normal and breath sounds normal.   Musculoskeletal: He exhibits no edema.   Neurological: He is alert and oriented to person, place, and time.   Skin: Skin is warm and dry.   Psychiatric: He has a normal mood and affect.     Procedures      ICD-10-CM ICD-9-CM   1. Coronary artery disease involving coronary bypass graft of native heart without angina pectoris I25.810 414.05   2. Chronic systolic (congestive) heart failure I50.22 428.22     428.0   3. Tobacco abuse Z72.0 305.1   4. Essential hypertension I10 401.9   5. Dyslipidemia E78.5 272.4   6. Paroxysmal atrial fibrillation (CMS/HCC) I48.0 427.31   7. AICD (automatic " cardioverter/defibrillator) present Z95.810 V45.02         Assessment/Plan:  1.CAD: S/p PCI to OM2 via SVG 11/2017.  Chest pain-free.  Continue aspirin, atorvastatin, and carvedilol.       2. Chronic systolic CHF: Ischemic cardiomyopathy with: AICD in place.   Remains stable and is euvolemic on exam.  Continue lisinopril, spironolactone, and carvedilol.      3. Tobacco abuse:  Continues to smoke very heavily at roughly 1.5 packs per day.  Not ready to quit smoking at this time. I advised Juan A of the risks of continuing to use tobacco, and I provided him with tobacco cessation educational materials in the After Visit Summary.  During this visit, I spent 6 minutes counseling the patient regarding tobacco cessation.      4.  Hypertension: Blood pressure is reasonably well controlled on current therapy.      5.  Dyslipidemia: Patient admits to not taking atorvastatin.  Will decrease the dose and order lipid panel prior 6 month follow up.       6.  Paroxysmal atrial fibrillation:  Regular rhythm on exam.  No recent evidence of recurrence.  Continue carvedilol and anticoagulation with Eliquis      7.  AICD:  Normal function on interrogation from 6/10/2019.

## 2019-07-08 ENCOUNTER — TELEPHONE (OUTPATIENT)
Dept: CARDIOLOGY | Facility: CLINIC | Age: 54
End: 2019-07-08

## 2019-07-08 NOTE — TELEPHONE ENCOUNTER
Pt calling re: monitor questions.  The monitor he has will not work over the internet and he does not have a land line.  Pt has called the company to request an attachment to be able to use internet with this but it will be several days before he gets it.  He is wanting to speak with someone re: this/tino

## 2019-07-10 ENCOUNTER — TELEPHONE (OUTPATIENT)
Dept: CARDIOLOGY | Facility: CLINIC | Age: 54
End: 2019-07-10

## 2019-07-10 NOTE — TELEPHONE ENCOUNTER
RN spoke with patient about MRI compatibility with his device:  All components of his device ([employer] Ellipse DR 2411-36Q AICD, [employer] 4470 atrial lead, and [employer] Durata 7120Q ventricular lead) are all MRI compatible.  However, the [employer] atrial lead is only MRI compatible under 1.5T MRI configurations (not 3T).  RN will verify this information with device companies and will contact patient when a conclusion has been reached.

## 2019-07-10 NOTE — TELEPHONE ENCOUNTER
"Per [employer] device rep, patient's system is not MRI compatible because he has a \"mixed system\" (two different manufacturers of components).  However, device rep did state that some facilities in [place] will scan mixed systems.  No local facilities will scan mixed systems.  Patient updated with this information.      Patient wanted to know why a mixed system was implanted in the first place.  RN recommended that patient ask Dr. Blackmon, the implanting physician.  Patient also wanted to know if the lead could be changed out so that he could have a MRI.  RN also recommended that patient speak to Dr. Blackmon about having the lead removed/changed.  Understanding verbalized.  Per patient, he is waiting for a call back from Dr. Blackmon's office.  "

## 2019-07-10 NOTE — TELEPHONE ENCOUNTER
This pt called asking for someone to call him regarding his AICD checked on 6/10/19. He is also needing to have MRI with his neurologist and wants to know if this is possible with type of AICD he has. Please call

## 2019-07-11 NOTE — TELEPHONE ENCOUNTER
Patient called stating he found a center for his MRI scan. He would like for a MRI form to be faxed to Rose at the [facility] in [place], [place]    Phone number: [phone]  Fax number: [phone]    Please advise.

## 2019-07-11 NOTE — TELEPHONE ENCOUNTER
Patient called stating that he spoke with Dr. Blackmon and patient will have his neurologist order that MRI be performed at [facility].

## 2019-07-31 ENCOUNTER — NURSE TRIAGE (OUTPATIENT)
Dept: CALL CENTER | Facility: HOSPITAL | Age: 54
End: 2019-07-31

## 2019-08-13 ENCOUNTER — TELEPHONE (OUTPATIENT)
Dept: CARDIOLOGY | Facility: CLINIC | Age: 54
End: 2019-08-13

## 2019-08-13 NOTE — TELEPHONE ENCOUNTER
PT SON CAME IN SAID THEY CALLED FOR SAMPLES PT IS IN THE DOUGHNUT HOLE. GAVE 4 BOXES ELIQUIS. AND PT ASSISTANT FORMS

## 2019-09-09 ENCOUNTER — TELEPHONE (OUTPATIENT)
Dept: CARDIOLOGY | Facility: CLINIC | Age: 54
End: 2019-09-09

## 2019-09-09 NOTE — TELEPHONE ENCOUNTER
He does not need to send a manual transmission. His monitor is online and connected to the network. It will send a automatic transmission scheduled for 9/11/19. Thanks

## 2019-09-09 NOTE — TELEPHONE ENCOUNTER
PT CALLED ASKED IF HE COULD DO HOME SCAN NOW THAT HIS MERLIN WAS SETUP ASKED PUMA SHE SAID YES. PUMA ASKED I SEND YOU A MESSAGE TO MAKE SURE YOU AWARE. THIS WILL BE HIS FIRST SCAN AT HOME.

## 2019-09-11 ENCOUNTER — CLINICAL SUPPORT (OUTPATIENT)
Dept: CARDIOLOGY | Facility: CLINIC | Age: 54
End: 2019-09-11

## 2019-09-11 DIAGNOSIS — Z95.810 AICD (AUTOMATIC CARDIOVERTER/DEFIBRILLATOR) PRESENT: ICD-10-CM

## 2019-09-11 DIAGNOSIS — Z95.818 STATUS POST PLACEMENT OF IMPLANTABLE LOOP RECORDER: Primary | ICD-10-CM

## 2019-09-11 PROCEDURE — 93296 REM INTERROG EVL PM/IDS: CPT | Performed by: INTERNAL MEDICINE

## 2019-09-11 PROCEDURE — 93295 DEV INTERROG REMOTE 1/2/MLT: CPT | Performed by: INTERNAL MEDICINE

## 2019-09-11 NOTE — PROGRESS NOTES
I have reviewed the notes, assessments, and/or procedures performed by Nu Mcelroy, I concur with her/his documentation of Juan A Chavez.

## 2019-09-11 NOTE — PROGRESS NOTES
Dual Chamber AICD Evaluation Report  Merlin    September 11, 2019    Primary Cardiologist: Eri  : [employer] Medical Model: Ellipse  Implant date: 4/18/2014    Reason for evaluation: routine  Indication for AICD: history of sudden cardiac death    Measurements  Atrial sensing - P wave: >5 mV  Atrial threshold: N/R   Atrial lead impedance: 560 ohms  Ventricular sensing - R wave: 11.7 mV  Ventricular threshold: N/R  Ventricular lead impedance:  460 ohms  Shock impedance:  RV- 50 ohms         Diagnostic Data  Atrial paced: 62 %  Ventricular paced: <1 %  Other: No new episodes   Battery status: satisfactory  3.8-4.2      Final Parameters  Mode: DDDR  Lower rate: 60 bpm Upper rate: 120 bpm  AV Delay: Paced- 200 ms    Sensed- 200 ms     Atrial - Amplitude: 2 V Pulse width: 0.5 ms Sensitivity: Auto mV   Ventricular - Amplitude: 2 V Pulse width: 0.5 ms Sensitivity: Auto mV   Changes made: N/A  Conclusions: normal AICD function, no therapy delivered and adequate battery reserve    Follow up: 3 months

## 2019-10-29 ENCOUNTER — TRANSCRIBE ORDERS (OUTPATIENT)
Dept: ADMINISTRATIVE | Facility: HOSPITAL | Age: 54
End: 2019-10-29

## 2019-10-29 DIAGNOSIS — M54.2 CERVICAL PAIN: Primary | ICD-10-CM

## 2019-11-13 ENCOUNTER — APPOINTMENT (OUTPATIENT)
Dept: CT IMAGING | Facility: HOSPITAL | Age: 54
End: 2019-11-13

## 2019-12-11 ENCOUNTER — CLINICAL SUPPORT (OUTPATIENT)
Dept: CARDIOLOGY | Facility: CLINIC | Age: 54
End: 2019-12-11

## 2019-12-11 DIAGNOSIS — Z95.810 AICD (AUTOMATIC CARDIOVERTER/DEFIBRILLATOR) PRESENT: ICD-10-CM

## 2019-12-11 PROCEDURE — 93295 DEV INTERROG REMOTE 1/2/MLT: CPT | Performed by: PHYSICIAN ASSISTANT

## 2019-12-11 PROCEDURE — 93296 REM INTERROG EVL PM/IDS: CPT | Performed by: PHYSICIAN ASSISTANT

## 2019-12-23 ENCOUNTER — RESULTS ENCOUNTER (OUTPATIENT)
Dept: CARDIOLOGY | Facility: CLINIC | Age: 54
End: 2019-12-23

## 2019-12-23 DIAGNOSIS — E78.5 DYSLIPIDEMIA: ICD-10-CM

## 2019-12-23 DIAGNOSIS — I25.810 CORONARY ARTERY DISEASE INVOLVING CORONARY BYPASS GRAFT OF NATIVE HEART WITHOUT ANGINA PECTORIS: ICD-10-CM

## 2019-12-27 NOTE — PROGRESS NOTES
Dual Chamber AICD Evaluation Report  [facility]    December 27, 2019    Primary Cardiologist: Eri  : [employer] Medical Model: Ellipse  Implant date: 4/18/14    Reason for evaluation: routine  Indication for AICD: history of sudden cardiac death    Measurements  Atrial sensing - P wave: 4.1 mV  Atrial threshold: n/r  Atrial lead impedance: 540 ohms  Ventricular sensing - R wave: 11.7 mV  Ventricular threshold: n/r  Ventricular lead impedance:  430 ohms  Shock impedance:  RV- 53 ohms         Diagnostic Data  Atrial paced: 82 %  Ventricular paced: 1.8 %  Other: 1 AMS episode that lasted for 6 sec.  Battery status: satisfactory        Final Parameters  Mode: DDDR  Lower rate: 60 bpm Upper rate: 120 bpm  AV Delay: Paced- 200 ms    Sensed- 200 ms     Atrial - Amplitude: 2.0 V Pulse width: 0.5 ms Sensitivity: auto   Ventricular - Amplitude: 2.0 V Pulse width: 0.5 ms Sensitivity: auto   Changes made: n/a  Conclusions: normal AICD function    Follow up: 3 months

## 2019-12-31 NOTE — PROGRESS NOTES
I have reviewed the notes, assessments, and/or procedures performed by Rose Augustin, I concur with her/his documentation of Juan A Chavez.

## 2020-05-04 DIAGNOSIS — I48.0 PAROXYSMAL ATRIAL FIBRILLATION (HCC): ICD-10-CM

## 2020-05-04 RX ORDER — APIXABAN 5 MG/1
5 TABLET, FILM COATED ORAL 2 TIMES DAILY
Qty: 180 TABLET | Refills: 3 | Status: SHIPPED | OUTPATIENT
Start: 2020-05-04 | End: 2021-05-04

## 2020-05-13 ENCOUNTER — TELEPHONE (OUTPATIENT)
Dept: CARDIOLOGY | Facility: CLINIC | Age: 55
End: 2020-05-13

## 2020-05-13 NOTE — TELEPHONE ENCOUNTER
PT CALLED AND STATED HE WANTED TO CHANGE ANTICOAGULANT DUE TO COST HE STATED HE HAS NOT HAD ANY ELIQUIS IN 1 MONTH GAVE PT 4 BOXES OF SAMPLES OF ELIQUIS 5 MG AND GAVE PT ASSISTANCE FORMS

## 2020-05-13 NOTE — TELEPHONE ENCOUNTER
If patient does not qualify for assistance with Eliquis we can try to switch him to an alternative novel oral anticoagulant if his insurance covers that (such as Xarelto or Pradaxa).  Otherwise, we can switch him to warfarin.

## 2020-07-10 ENCOUNTER — TELEPHONE (OUTPATIENT)
Dept: CARDIOLOGY | Facility: CLINIC | Age: 55
End: 2020-07-10

## (undated) DEVICE — SYR LUERLOK 20CC BX/50

## (undated) DEVICE — 6F .070 JR 4 100CM: Brand: CORDIS

## (undated) DEVICE — EMBOLIC PROTECTION SYSTEM: Brand: FILTERWIRE EZ™

## (undated) DEVICE — NC TREK CORONARY DILATATION CATHETER 3.5 MM X 8 MM / RAPID-EXCHANGE: Brand: NC TREK

## (undated) DEVICE — 6F .070 LCB 100CM: Brand: VISTA BRITE TIP

## (undated) DEVICE — PK CATH CARD 30

## (undated) DEVICE — PINNACLE INTRODUCER SHEATH: Brand: PINNACLE

## (undated) DEVICE — GLV SURG TRIUMPH GREEN W/ALOE PF LTX 7 STRL

## (undated) DEVICE — PACK,UNIVERSAL,NO GOWNS: Brand: MEDLINE

## (undated) DEVICE — PIN DISTRACT TI 14MM STRL

## (undated) DEVICE — GLV SURG BIOGEL LTX PF 7 1/2

## (undated) DEVICE — PK TURNOVER RM ADV

## (undated) DEVICE — MINI TREK CORONARY DILATATION CATHETER 2.0 MM X 12 MM / RAPID-EXCHANGE: Brand: MINI TREK

## (undated) DEVICE — RESERVOIR,SUCTION,100CC,SILICONE: Brand: MEDLINE

## (undated) DEVICE — INFLATION DEVICE: Brand: ENCORE™ 26

## (undated) DEVICE — GW STARTER FXD CORE J .035 3X150CM 3MM

## (undated) DEVICE — CVR UNIV C/ARM

## (undated) DEVICE — GLV SURG BIOGEL LTX PF 6 1/2

## (undated) DEVICE — APPL CHLORAPREP W/TINT 26ML ORNG

## (undated) DEVICE — GLV SURG SENSICARE W/ALOE PF LF 7.5 STRL

## (undated) DEVICE — FR5 INFINITI MULTIPAC: Brand: INFINITI

## (undated) DEVICE — TP SILK DURAPORE 3IN

## (undated) DEVICE — ELECTRD PAD DEFIB A/

## (undated) DEVICE — MODEL BT2000 P/N 700287-012KIT CONTENTS: MANIFOLD WITH SALINE AND CONTRAST PORTS, SALINE TUBING WITH SPIKE AND HAND SYRINGE, TRANSDUCER: Brand: BT2000 AUTOMATED MANIFOLD KIT

## (undated) DEVICE — KT VLV HEMO MAP ACC PLS LG/BORE MTL/INTRO

## (undated) DEVICE — PK SPINE CERV ANT 30

## (undated) DEVICE — SOL IRR NACL 0.9PCT BT 1000ML

## (undated) DEVICE — MODEL AT P65, P/N 701554-001KIT CONTENTS: HAND CONTROLLER, 3-WAY HIGH-PRESSURE STOPCOCK WITH ROTATING END AND PREMIUM HIGH-PRESSURE TUBING: Brand: ANGIOTOUCH® KIT

## (undated) DEVICE — GLV SURG TRIUMPH GREEN W/ALOE PF LTX 8 STRL

## (undated) DEVICE — HALTR TRACT HD CERV STD UNIV

## (undated) DEVICE — ANTIBACTERIAL UNDYED BRAIDED (POLYGLACTIN 910), SYNTHETIC ABSORBABLE SUTURE: Brand: COATED VICRYL

## (undated) DEVICE — GLV SURG SENSICARE W/ALOE PF LF 8 STRL

## (undated) DEVICE — GW PTCA ASAHI PROWATER .014 180CM

## (undated) DEVICE — GW STARTER FXD CORE J .035 3X260CM 3MM

## (undated) DEVICE — SOLIDIFIER LIQUI LOC PLUS 2000CC

## (undated) DEVICE — DRAPE,UTILITY,TAPE,15X26,STERILE: Brand: MEDLINE

## (undated) DEVICE — Device

## (undated) DEVICE — ELECTRD NDL EDGE/INSUL/PFTE.787MM 2.84IN

## (undated) DEVICE — PERCLOSE PROGLIDE™ SUTURE-MEDIATED CLOSURE SYSTEM: Brand: PERCLOSE PROGLIDE™

## (undated) DEVICE — CANN CO2/O2 NASL A/

## (undated) DEVICE — 4-PORT MANIFOLD: Brand: NEPTUNE 2

## (undated) DEVICE — CATH IV ANGIO FEP 12G 3IN LTBLU 10PK

## (undated) DEVICE — CATH F5 INF IM 100CM: Brand: INFINITI

## (undated) DEVICE — SPNG DISSCT CHRRY 3/8IN STRL PK/5

## (undated) DEVICE — 3.0MM PRECISION NEURO (MATCH HEAD)